# Patient Record
Sex: MALE | Race: WHITE | NOT HISPANIC OR LATINO | Employment: OTHER | URBAN - METROPOLITAN AREA
[De-identification: names, ages, dates, MRNs, and addresses within clinical notes are randomized per-mention and may not be internally consistent; named-entity substitution may affect disease eponyms.]

---

## 2018-03-19 ENCOUNTER — OFFICE VISIT CONVERTED (OUTPATIENT)
Dept: SURGERY | Facility: CLINIC | Age: 63
End: 2018-03-19
Attending: SURGERY

## 2018-04-30 ENCOUNTER — OFFICE VISIT CONVERTED (OUTPATIENT)
Dept: SURGERY | Facility: CLINIC | Age: 63
End: 2018-04-30
Attending: SURGERY

## 2019-01-03 ENCOUNTER — OFFICE VISIT CONVERTED (OUTPATIENT)
Dept: INTERNAL MEDICINE | Facility: CLINIC | Age: 64
End: 2019-01-03
Attending: NURSE PRACTITIONER

## 2019-01-03 ENCOUNTER — CONVERSION ENCOUNTER (OUTPATIENT)
Dept: INTERNAL MEDICINE | Facility: CLINIC | Age: 64
End: 2019-01-03

## 2019-11-08 ENCOUNTER — HOSPITAL ENCOUNTER (OUTPATIENT)
Dept: OTHER | Facility: HOSPITAL | Age: 64
Discharge: HOME | End: 2019-11-08
Attending: NURSE PRACTITIONER

## 2019-11-08 LAB
ALBUMIN SERPL-MCNC: 4.5 G/DL (ref 3.5–5)
ALBUMIN/GLOB SERPL: 1.7 {RATIO} (ref 1.4–2.6)
ALP SERPL-CCNC: 82 U/L (ref 56–155)
ALT SERPL-CCNC: 25 U/L (ref 10–40)
ANION GAP SERPL CALC-SCNC: 22 MMOL/L (ref 8–19)
AST SERPL-CCNC: 18 U/L (ref 15–50)
BASOPHILS # BLD AUTO: 0.04 10*3/UL (ref 0–0.2)
BASOPHILS NFR BLD AUTO: 0.4 % (ref 0–3)
BILIRUB SERPL-MCNC: 0.42 MG/DL (ref 0.2–1.3)
BUN SERPL-MCNC: 19 MG/DL (ref 5–25)
BUN/CREAT SERPL: 21 {RATIO} (ref 6–20)
CALCIUM SERPL-MCNC: 9.8 MG/DL (ref 8.7–10.4)
CHLORIDE SERPL-SCNC: 104 MMOL/L (ref 99–111)
CHOLEST SERPL-MCNC: 200 MG/DL (ref 107–200)
CHOLEST/HDLC SERPL: 4.4 {RATIO} (ref 3–6)
CONV ABS IMM GRAN: 0.03 10*3/UL (ref 0–0.2)
CONV CO2: 20 MMOL/L (ref 22–32)
CONV IMMATURE GRAN: 0.3 % (ref 0–1.8)
CONV TOTAL PROTEIN: 7.1 G/DL (ref 6.3–8.2)
CREAT UR-MCNC: 0.91 MG/DL (ref 0.7–1.2)
DEPRECATED RDW RBC AUTO: 41.8 FL (ref 35.1–43.9)
EOSINOPHIL # BLD AUTO: 0.3 10*3/UL (ref 0–0.7)
EOSINOPHIL # BLD AUTO: 3 % (ref 0–7)
ERYTHROCYTE [DISTWIDTH] IN BLOOD BY AUTOMATED COUNT: 13.2 % (ref 11.6–14.4)
GFR SERPLBLD BASED ON 1.73 SQ M-ARVRAT: >60 ML/MIN/{1.73_M2}
GLOBULIN UR ELPH-MCNC: 2.6 G/DL (ref 2–3.5)
GLUCOSE SERPL-MCNC: 103 MG/DL (ref 70–99)
HCT VFR BLD AUTO: 47.2 % (ref 42–52)
HDLC SERPL-MCNC: 45 MG/DL (ref 40–60)
HGB BLD-MCNC: 15.9 G/DL (ref 14–18)
LDLC SERPL CALC-MCNC: 135 MG/DL (ref 70–100)
LYMPHOCYTES # BLD AUTO: 2.59 10*3/UL (ref 1–5)
LYMPHOCYTES NFR BLD AUTO: 25.7 % (ref 20–45)
MCH RBC QN AUTO: 29.3 PG (ref 27–31)
MCHC RBC AUTO-ENTMCNC: 33.7 G/DL (ref 33–37)
MCV RBC AUTO: 86.9 FL (ref 80–96)
MONOCYTES # BLD AUTO: 0.61 10*3/UL (ref 0.2–1.2)
MONOCYTES NFR BLD AUTO: 6.1 % (ref 3–10)
NEUTROPHILS # BLD AUTO: 6.49 10*3/UL (ref 2–8)
NEUTROPHILS NFR BLD AUTO: 64.5 % (ref 30–85)
NRBC CBCN: 0 % (ref 0–0.7)
OSMOLALITY SERPL CALC.SUM OF ELEC: 297 MOSM/KG (ref 273–304)
PLATELET # BLD AUTO: 235 10*3/UL (ref 130–400)
PMV BLD AUTO: 10.2 FL (ref 9.4–12.4)
POTASSIUM SERPL-SCNC: 4.3 MMOL/L (ref 3.5–5.3)
PSA SERPL-MCNC: 2.71 NG/ML (ref 0–4)
RBC # BLD AUTO: 5.43 10*6/UL (ref 4.7–6.1)
SODIUM SERPL-SCNC: 142 MMOL/L (ref 135–147)
TRIGL SERPL-MCNC: 99 MG/DL (ref 40–150)
VLDLC SERPL-MCNC: 20 MG/DL (ref 5–37)
WBC # BLD AUTO: 10.06 10*3/UL (ref 4.8–10.8)

## 2019-11-14 ENCOUNTER — OFFICE VISIT CONVERTED (OUTPATIENT)
Dept: INTERNAL MEDICINE | Facility: CLINIC | Age: 64
End: 2019-11-14
Attending: NURSE PRACTITIONER

## 2019-11-14 ENCOUNTER — CONVERSION ENCOUNTER (OUTPATIENT)
Dept: INTERNAL MEDICINE | Facility: CLINIC | Age: 64
End: 2019-11-14

## 2020-05-12 ENCOUNTER — TELEMEDICINE CONVERTED (OUTPATIENT)
Dept: INTERNAL MEDICINE | Facility: CLINIC | Age: 65
End: 2020-05-12
Attending: NURSE PRACTITIONER

## 2020-05-15 ENCOUNTER — HOSPITAL ENCOUNTER (OUTPATIENT)
Dept: OTHER | Facility: HOSPITAL | Age: 65
Discharge: HOME | End: 2020-05-15
Attending: NURSE PRACTITIONER

## 2020-05-15 LAB
ALBUMIN SERPL-MCNC: 4.2 G/DL (ref 3.5–5)
ALBUMIN/GLOB SERPL: 1.8 {RATIO} (ref 1.4–2.6)
ALP SERPL-CCNC: 78 U/L (ref 56–155)
ALT SERPL-CCNC: 23 U/L (ref 10–40)
ANION GAP SERPL CALC-SCNC: 18 MMOL/L (ref 8–19)
AST SERPL-CCNC: 18 U/L (ref 15–50)
BASOPHILS # BLD AUTO: 0.04 10*3/UL (ref 0–0.2)
BASOPHILS NFR BLD AUTO: 0.5 % (ref 0–3)
BILIRUB SERPL-MCNC: 0.5 MG/DL (ref 0.2–1.3)
BUN SERPL-MCNC: 19 MG/DL (ref 5–25)
BUN/CREAT SERPL: 20 {RATIO} (ref 6–20)
CALCIUM SERPL-MCNC: 9.2 MG/DL (ref 8.7–10.4)
CHLORIDE SERPL-SCNC: 106 MMOL/L (ref 99–111)
CHOLEST SERPL-MCNC: 179 MG/DL (ref 107–200)
CHOLEST/HDLC SERPL: 4.4 {RATIO} (ref 3–6)
CONV ABS IMM GRAN: 0.03 10*3/UL (ref 0–0.2)
CONV CO2: 22 MMOL/L (ref 22–32)
CONV IMMATURE GRAN: 0.4 % (ref 0–1.8)
CONV TOTAL PROTEIN: 6.6 G/DL (ref 6.3–8.2)
CREAT UR-MCNC: 0.93 MG/DL (ref 0.7–1.2)
DEPRECATED RDW RBC AUTO: 44.2 FL (ref 35.1–43.9)
EOSINOPHIL # BLD AUTO: 0.26 10*3/UL (ref 0–0.7)
EOSINOPHIL # BLD AUTO: 3.4 % (ref 0–7)
ERYTHROCYTE [DISTWIDTH] IN BLOOD BY AUTOMATED COUNT: 13.8 % (ref 11.6–14.4)
GFR SERPLBLD BASED ON 1.73 SQ M-ARVRAT: >60 ML/MIN/{1.73_M2}
GLOBULIN UR ELPH-MCNC: 2.4 G/DL (ref 2–3.5)
GLUCOSE SERPL-MCNC: 96 MG/DL (ref 70–99)
HCT VFR BLD AUTO: 47.3 % (ref 42–52)
HDLC SERPL-MCNC: 41 MG/DL (ref 40–60)
HGB BLD-MCNC: 15.3 G/DL (ref 14–18)
LDLC SERPL CALC-MCNC: 119 MG/DL (ref 70–100)
LYMPHOCYTES # BLD AUTO: 1.75 10*3/UL (ref 1–5)
LYMPHOCYTES NFR BLD AUTO: 23 % (ref 20–45)
MCH RBC QN AUTO: 28.7 PG (ref 27–31)
MCHC RBC AUTO-ENTMCNC: 32.3 G/DL (ref 33–37)
MCV RBC AUTO: 88.7 FL (ref 80–96)
MONOCYTES # BLD AUTO: 0.63 10*3/UL (ref 0.2–1.2)
MONOCYTES NFR BLD AUTO: 8.3 % (ref 3–10)
NEUTROPHILS # BLD AUTO: 4.89 10*3/UL (ref 2–8)
NEUTROPHILS NFR BLD AUTO: 64.4 % (ref 30–85)
NRBC CBCN: 0 % (ref 0–0.7)
OSMOLALITY SERPL CALC.SUM OF ELEC: 296 MOSM/KG (ref 273–304)
PLATELET # BLD AUTO: 208 10*3/UL (ref 130–400)
PMV BLD AUTO: 10.9 FL (ref 9.4–12.4)
POTASSIUM SERPL-SCNC: 4.4 MMOL/L (ref 3.5–5.3)
RBC # BLD AUTO: 5.33 10*6/UL (ref 4.7–6.1)
SODIUM SERPL-SCNC: 142 MMOL/L (ref 135–147)
T4 FREE SERPL-MCNC: 1.1 NG/DL (ref 0.9–1.8)
TRIGL SERPL-MCNC: 94 MG/DL (ref 40–150)
TSH SERPL-ACNC: 1.72 M[IU]/L (ref 0.27–4.2)
VLDLC SERPL-MCNC: 19 MG/DL (ref 5–37)
WBC # BLD AUTO: 7.6 10*3/UL (ref 4.8–10.8)

## 2020-06-03 ENCOUNTER — OFFICE VISIT CONVERTED (OUTPATIENT)
Dept: INTERNAL MEDICINE | Facility: CLINIC | Age: 65
End: 2020-06-03
Attending: NURSE PRACTITIONER

## 2020-06-03 ENCOUNTER — HOSPITAL ENCOUNTER (OUTPATIENT)
Dept: OTHER | Facility: HOSPITAL | Age: 65
Discharge: HOME | End: 2020-06-03
Attending: NURSE PRACTITIONER

## 2020-12-21 ENCOUNTER — TELEMEDICINE CONVERTED (OUTPATIENT)
Dept: INTERNAL MEDICINE | Facility: CLINIC | Age: 65
End: 2020-12-21
Attending: NURSE PRACTITIONER

## 2021-01-21 ENCOUNTER — HOSPITAL ENCOUNTER (OUTPATIENT)
Dept: URGENT CARE | Facility: CLINIC | Age: 66
Discharge: HOME | End: 2021-01-21
Attending: EMERGENCY MEDICINE

## 2021-01-22 LAB — SARS-COV-2 RNA SPEC QL NAA+PROBE: NOT DETECTED

## 2021-05-13 NOTE — PROGRESS NOTES
Progress Note      Patient Name: Durga Alonso   Patient ID: [id number]   Sex: Male   YOB: 1955    Primary Care Provider: Ramandeep KELLEY    Visit Date: May 12, 2020    Provider: WARREN Lema   Location: [facility]   Location Address: [address] [place] [address], [facility]  [place], [place]  [id number]   Location Phone: [phone]          Chief Complaint     f/u-htn    anxiety       History Of Present Illness  Video Conferencing Visit  Durga Alonso is a 65 year old /White male who is presenting for evaluation via video conferencing. Verbal consent obtained before beginning visit.   The following staff were present during this visit: Ramandeep Gray, LIANA; Grzegorz Graham LPN   Durga Alonso is a 65 year old /White male who presents for evaluation and treatment of:      Anxiety-added stress; he has been recently moved to a different office where he is the . 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 boyfriend causing issues. they are supposed to be saving money to find their own place soon  he feels like he is dealing well with stress  sleeping fairly well, sleep hygiene things to improve on  exercising early morning, napping in the afternoon  supervisor at work asked him about self-care/stress relief which made him want to have this visit to discuss  he reports choosing his responses carefully with his wife. he tries to remain positive.  he denies feeling depressed, denies SI/HI    HTN-120/80, HR 60-70s  wt 216, trying to eat healthier    HLD-labs pending, no issues with zetia    via zoom       Past Medical History  Disease  Name Date Onset Notes   Hyperlipidemia --  --    Hypertension --  --          Past Surgical History  Procedure Name Date Notes   *I have had no surgeries --  --          Medication List  Name Date Started Instructions   Glucosamine 500 mg oral tablet  take 1 tablet by oral route daily   lisinopril 20 mg oral tablet 04/14/2020 take 1 tablet (20 mg) by oral route once daily   Zetia 10 mg oral tablet 03/25/2020 take 1 tablet (10 mg) by oral route once daily for 90 days         Allergy List  Allergen Name Date Reaction Notes   NO KNOWN DRUG ALLERGIES --  --  --          Family Medical History  Disease Name Relative/Age Notes   Diabetes, unspecified type Mother/   Mother         Social History  Finding Status Start/Stop Quantity Notes   Alcohol Never --/-- --  drinks no   Caffeine Current every day --/-- --  drinks regularly; coffee; 3-4 times per day   Second hand smoke exposure Never --/-- --  no   Tobacco Former [age] --  former smoker; started smoking at age 12; quit smoking at age 40; smoked 10 cigarette(s) per day         Immunizations  NameDate Admin Mfg Trade Name Lot Number Route Inj VIS Given VIS Publication   Jywshneep31/14/2019 [facility] FLUZONE-HIGH DOSE [id number] [facility] 11/14/2019    Comments: Patient tolerated well left office in stable condition. BERENICE RN   Prevnar 1311/14/2019 [facility] PREVNAR 13 [id number] [facility] 11/14/2019    Comments: Patient tolerated well left office in stable condition. BERENICE RN         Review of Systems  · Constitutional  o Admits  o : weight loss  o Denies  o : fever, fatigue, weight gain  · Cardiovascular  o Denies  o : lower extremity edema, claudication, chest pressure, palpitations  · Respiratory  o Denies  o : shortness of breath, wheezing, frequent cough, hemoptysis, dyspnea on exertion  · Gastrointestinal  o Denies  o : nausea, vomiting, diarrhea, constipation, abdominal pain      Physical Examination     Gen: well-nourished, no acute distress  HENT: atraumatic, normocephalic  Eyes: extraocular  movements intact, no scleral icterus  Lung: breathing comfortably, no cough  Skin: no visible rash, no lesions  Neuro: grossly oriented to person, place, and time. no facial droop   Psych: normal mood and affect             Assessment  · Essential hypertension     401.9/I10  well controlled, he plans to continue monitoring. cont meds at this time  · Hyperlipidemia     272.4/E78.5  cont meds, he will come in this week for labs to be drawn  · Stress     V62.89/F43.9  discussed stress at length. continue to work on sleep hygiene and healthy lifestyle choices-exercise, diet modification, work/life balance. also discussed taking time for himself to relax. he will call for new or worsening concerns      Plan  · Orders  o ACO-39: Current medications updated and reviewed () - - 05/12/2020  · Medications  o Medications have been Reconciled  o Transition of Care or Provider Policy  · Instructions  o Recommended exercise program to assist with cholesterol, weight loss and overall health improvement.  o Advised that cheeses and other sources of dairy fats, animal fats, fast food, and the extras (candy, pastries, pies, doughnuts and cookies) all contain LDL raising nutrients. Advised to increase fruits, vegetables, whole grains, and to monitor portion sizes.   o Patient was educated/instructed on their diagnosis, treatment and medications prior to discharge from the clinic today.  o Call the office with any concerns or questions.  o Discussed Covid-19 precautions including, but not limited to, social distancing, avoid touching your face, and hand washing.   · Disposition  o Call or Return if symptoms worsen or persist.            Electronically Signed by: Ramandeep Gray APRN -Author on May 12, 2020 10:25:30 AM

## 2021-05-13 NOTE — PROGRESS NOTES
Progress Note      Patient Name: Durga Alonso   Patient ID: [id number]   Sex: Male   YOB: 1955    Primary Care Provider: Ramandeep KELLEY    Visit Date: Payal 3, 2020    Provider: WARREN Bobby   Location: [facility]   Location Address: [address] [place] [address], [facility]  [place], [place]  [id number]   Location Phone: [phone]          Chief Complaint  · Left foot big toe stubbed a week ago and now toenail black and loose per patient      History Of Present Illness  Durga Alonso is a 65 year old /White male who presents for evaluation and treatment of:      Patient reports he stubbed his left great toe when walking down stairs striking the end of the toe causing pain, bruising and swelling. This occurred about 1.5 weeks ago and patient continues to have pain, swelling, redness, bruising and noticed that the toe nail is lifting on the left side. Patient denies fever and drainage at this time.       Past Medical History  Disease Name Date Onset Notes   Hyperlipidemia --  --    Hypertension --  --          Past Surgical History  Procedure Name Date Notes   *I have had no surgeries --  --          Medication List  Name Date Started Instructions   lisinopril 20 mg oral tablet 04/14/2020 take 1 tablet (20 mg) by oral route once daily   Zetia 10 mg oral tablet 03/25/2020 take 1 tablet (10 mg) by oral route once daily for 90 days         Allergy List  Allergen Name Date Reaction Notes   NO KNOWN DRUG ALLERGIES --  --  --          Family Medical History  Disease Name Relative/Age Notes   Diabetes, unspecified type Mother/   Mother         Social History  Finding Status Start/Stop Quantity Notes   Alcohol Never --/-- --  drinks no   Caffeine Current every day --/-- --  drinks regularly; coffee; 3-4 times per day   Second hand smoke exposure Never --/-- --  no   Tobacco Former 13/40 --  former smoker; started smoking at age 12; quit smoking at age 40; smoked 10  "cigarette(s) per day         Immunizations  NameDate Admin Mfg Trade Name Lot Number Route Inj VIS Given VIS Publication   Zwcmejuig23/14/2019 [facility] FLUZONE-HIGH DOSE [id number] [facility] 11/14/2019    Comments: Patient tolerated well left office in stable condition. BERENICE RN   Prevnar 1311/14/2019 [facility] PREVNAR 13 [id number] [facility] 11/14/2019    Comments: Patient tolerated well left office in stable condition. BERENICE RN         Review of Systems  · Constitutional  o Denies  o : fever, fatigue, weight loss, weight gain  · Cardiovascular  o Denies  o : lower extremity edema, claudication, chest pressure, palpitations  · Respiratory  o Denies  o : shortness of breath, wheezing, cough, hemoptysis, dyspnea on exertion  · Gastrointestinal  o Denies  o : nausea, vomiting, diarrhea, constipation, abdominal pain  · Musculoskeletal  o Admits  o : joint pain, joint swelling, foot pain      Vitals  Date Time BP Position Site L\R Cuff Size HR RR TEMP (F) WT  HT  BMI kg/m2 BSA m2 O2 Sat        01/03/2019 11:01 /90 Sitting    60 - R 15 97.8 219lbs 8oz 5'  9\" 32.41 2.2 97 %    11/14/2019 02:25 /78 Sitting    56 - R 14 98.6 222lbs 0oz 5'  2\" 40.6 2.1 98 %    06/03/2020 10:37 /84 Sitting    58 - R  97.4 223lbs 16oz 5'  8\" 34.06 2.21 97 %          Physical Examination  · Constitutional  o Appearance  o : no acute distress, well-nourished  · Head and Face  o Head  o :   § Inspection  § : atraumatic, normocephalic  · Eyes  o Eyes  o : extraocular movements intact, no scleral icterus, no conjunctival injection  · Ears, Nose, Mouth and Throat  o Ears  o :   § External Ears  § : normal  o Nose  o :   § Intranasal Exam  § : nares patent  o Oral Cavity  o :   § Oral Mucosa  § : moist mucous membranes  · Respiratory  o Respiratory Effort  o : breathing comfortably, symmetric chest rise  o Auscultation of Lungs  o : clear to asculatation bilaterally, no wheezes, rales, or rhonchii  · Cardiovascular  o Heart  o :   § Auscultation of Heart  § : " regular rate and rhythm, no murmurs, rubs, or gallops  · Neurologic  o Mental Status Examination  o :   § Orientation  § : grossly oriented to person, place and time  o Gait and Station  o :   § Gait Screening  § : normal gait  · Psychiatric  o General  o : normal mood and affect  · Right Ankle/Foot  o Inspection  o : no redness, swelling or atrophy; normal alignment and arch  o Palpation  o : no effusion, crepitus, or clicking  o Neurovascular  o : neurovascularly intact  o Range of Motion  o : normal range of motion  · Left Ankle/Foot  o Inspection  o : erythema present, swelling present, great toe has ecchymosis surrounding the toenail and subungual hematoma present. Left great toe nail is lifting along the medial aspect.   o Palpation  o : tenderness present along the great toe with palpation  o Neurovascular  o : neurovascularly intact , normal sensation  o Range of Motion  o : normal range of motion, with pain in the great toe due to injury.               Assessment  · Foot injury, left, initial encounter     959.7/S99.922A  left great toe pain after injury. Xray performed in office. Left foot x-ray displays a possible nondisplaced fracture line involving the first proximal phalanx. Patient was educated on pain relief with Tylenol and ibuprofen(being cautious with ibuprofen due to cardiovascular risk). Ice and elevation. Patient educated on how to splint the great toe with a popsicle stick and deborah taping system. Patient will follow-up with podiatry regarding toenail and fracture.  · Swelling     782.3/R60.9  · Toenail bruise, left, initial encounter     924.3/S90.222A  Due to redness and inflammation patient was started on Keflex at this time for possible infection. Toenail appears to be lifting on the left side, will get patient in with podiatry promptly to remove toenail. Educated patient on worsening s/s of infection to include redness, fever, drainage, pain. Educated to call the office with any new or  worsening symptoms.     Problems Reconciled  Plan  · Orders  o ACO-39: Current medications updated and reviewed () - - 06/03/2020  o Foot (Left) 3 or more views X-Ray [facility] Preferred View ([id number]) - 959.7/S99.922A, 782.3/R60.9, 924.3/S90.222A - 06/03/2020  o PODIATRY CONSULTATION (PODIA) - 959.7/S99.922A, 782.3/R60.9, 924.3/S90.222A - 06/03/2020  · Medications  o cephalexin 500 mg oral capsule   SIG: take 1 capsule (500 mg) by oral route every 12 hours for 10 days   DISP: (20) capsule with 0 refills  Prescribed on 06/03/2020     o Medications have been Reconciled  o Transition of Care or Provider Policy  · Instructions  o Take all medications as prescribed/directed.  o Patient was educated/instructed on their diagnosis, treatment and medications prior to discharge from the clinic today.  o Patient instructed to seek medical attention urgently for new or worsening symptoms.  o Call the office with any concerns or questions.  · Disposition  o Call or Return if symptoms worsen or persist.  o Meds sent to pharmacy  o As Needed for Follow Up  o Care Transition  o LURDES Sent            Electronically Signed by: WARREN Bobby -Author on Payal 3, 2020 01:01:22 PM

## 2021-05-14 NOTE — PROGRESS NOTES
"   Progress Note      Patient Name: Durga Alonso   Patient ID: [id number]   Sex: Male   YOB: 1955    Primary Care Provider: Ramandeep KELLEY    Visit Date: December 21, 2020    Provider: WARREN Lema   Location: [facility]   Location Address: [address] [place] [address], [address]  [place], [place]  [id number]   Location Phone: [phone]          Chief Complaint  · Telehealth/Zoom  · Follow up  · \"I need to talk about my spouse and her situation\"  · \"I recently went to the ER\"      History Of Present Illness  Video Conferencing Visit  Durga Alonso is a 65 year old /White male who is presenting for evaluation via video conferencing via Zoom. Verbal consent obtained before beginning visit.   The following staff were present during this visit: Ramandeep Gray,LIANA; Carly PHELPS MA      He did have some chest pain last week. was seen in the ED  right sided chest pain, stabbing near shoulder, radiated down right arm x1  denies SOA and sweating with CP episode  only occurred once after waking from sleep while lying in bed  he has been going up and down stairs several times per day. he denies having cp, fatigue, soa with activity or recurrence of CP episodes  no family hx of cardiac events    toe injury last june. new nail growing underneath. old nail damaged and slowly coming loose, not bothersome    concerned about his wife's health. earlier in the year she decided to stop all routine medications. She has seen dietician. stopped several supplements after this visit. she has restarted many of her routine meds at this time.  he reports that she is trying to eat healthier which is good. he feels like stress r/t her health may have contributed to his chest pain. he also reports job stress. he reports taking a step back from work and trying to change the way he views his job is helping some as well   Durga Alonso is a 65 year old /White male who presents for " evaluation and treatment of:       Past Medical History  Disease Name Date Onset Notes   Hyperlipidemia --  --    Hypertension --  --          Past Surgical History  Procedure Name Date Notes   *I have had no surgeries --  --          Medication List  Name Date Started Instructions   lisinopril 20 mg oral tablet 12/03/2020 take 1 tablet (20 mg) by oral route once daily   Zetia 10 mg oral tablet 09/01/2020 take 1 tablet (10 mg) by oral route once daily for 90 days         Allergy List  Allergen Name Date Reaction Notes   NO KNOWN DRUG ALLERGIES --  --  --        Allergies Reconciled  Family Medical History  Disease Name Relative/Age Notes   Diabetes, unspecified type Mother/   Mother         Social History  Finding Status Start/Stop Quantity Notes   Alcohol Never --/-- --  drinks no   Caffeine Current every day --/-- --  drinks regularly; coffee; 3-4 times per day   Second hand smoke exposure Never --/-- --  no   Tobacco Former [age] --  former smoker; started smoking at age 12; quit smoking at age 40; smoked 10 cigarette(s) per day         Immunizations  NameDate Admin Mfg Trade Name Lot Number Route Inj VIS Given VIS Publication   Ugbngnirk72/14/2019 [facility] FLUZONE-HIGH DOSE [id number] [facility] 11/14/2019    Comments: Patient tolerated well left office in stable condition. BERENICE RN   Prevnar 1311/14/2019 [facility] PREVNAR 13 [id number] [facility] 11/14/2019    Comments: Patient tolerated well left office in stable condition. BERENICE RN         Review of Systems  · Constitutional  o Denies  o : fever, fatigue, weight loss, weight gain  · Cardiovascular  o Denies  o : lower extremity edema, claudication, chest pressure, palpitations  · Respiratory  o Denies  o : shortness of breath, wheezing, cough, hemoptysis, dyspnea on exertion  · Gastrointestinal  o Denies  o : nausea, vomiting, diarrhea, constipation, abdominal pain      Physical Examination     Gen: well-nourished, no acute distress  HENT: atraumatic, normocephalic  Eyes: extraocular  movements intact, no scleral icterus  Lung: breathing comfortably, no cough  Skin: no visible rash, no lesions  Neuro: grossly oriented to person, place, and time. no facial droop   Psych: normal mood and affect             Assessment  · Hyperlipidemia     272.4/E78.5  · Chest pain     786.50/R07.9  Discussed multiple potential causes for chest pain including cardiac etiology. He will continue to monitor symptoms for recurrence. Verbalized that we could potentially do further work-up such as a stress test at this time. He has low suspicion that this is cardiac related. Discussed need for further work-up if he has recurrence of symptoms or if he develops symptoms such as shortness of breath, dizziness, chest pain with activities such as walking up the stairs. also discussed importance of stress management  · Essential hypertension     401.9/I10  he will come in for labs tomorrow. continue current medications    Problems Reconciled  Plan  · Orders  o Physical, Primary Care Panel (CBC, CMP, Lipid, TSH) [facility] ([id number], 35349, 80154, [address]10) - 401.9/I10, 272.4/E78.5 - 12/21/2020  o ACO-39: Current medications updated and reviewed (, 1159F) - - 12/21/2020  · Medications  o Medications have been Reconciled  o Transition of Care or Provider Policy  · Instructions  o Patient was educated/instructed on their diagnosis, treatment and medications prior to discharge from the clinic today.  o Call the office with any concerns or questions.  · Disposition  o Call or Return if symptoms worsen or persist.  o Follow up in 6 months  o Prescriptions sent electronically            Electronically Signed by: WARREN Lema -Author on December 21, 2020 12:42:04 PM

## 2021-05-15 VITALS
OXYGEN SATURATION: 98 % | HEIGHT: 62 IN | WEIGHT: 222 LBS | HEART RATE: 56 BPM | TEMPERATURE: 98.6 F | BODY MASS INDEX: 40.85 KG/M2 | RESPIRATION RATE: 14 BRPM | DIASTOLIC BLOOD PRESSURE: 78 MMHG | SYSTOLIC BLOOD PRESSURE: 118 MMHG

## 2021-05-15 VITALS
HEART RATE: 58 BPM | DIASTOLIC BLOOD PRESSURE: 84 MMHG | OXYGEN SATURATION: 97 % | WEIGHT: 224 LBS | TEMPERATURE: 97.4 F | HEIGHT: 68 IN | SYSTOLIC BLOOD PRESSURE: 132 MMHG | BODY MASS INDEX: 33.95 KG/M2

## 2021-05-16 VITALS
HEART RATE: 60 BPM | WEIGHT: 219.5 LBS | OXYGEN SATURATION: 97 % | RESPIRATION RATE: 15 BRPM | DIASTOLIC BLOOD PRESSURE: 90 MMHG | SYSTOLIC BLOOD PRESSURE: 138 MMHG | HEIGHT: 69 IN | BODY MASS INDEX: 32.51 KG/M2 | TEMPERATURE: 97.8 F

## 2021-05-16 VITALS — BODY MASS INDEX: 33.03 KG/M2 | RESPIRATION RATE: 14 BRPM | WEIGHT: 223 LBS | HEIGHT: 69 IN

## 2021-05-21 ENCOUNTER — HOSPITAL ENCOUNTER (OUTPATIENT)
Dept: OTHER | Facility: HOSPITAL | Age: 66
Discharge: HOME | End: 2021-05-21
Attending: NURSE PRACTITIONER

## 2021-05-21 LAB
ALBUMIN SERPL-MCNC: 4.3 G/DL (ref 3.5–5)
ALBUMIN/GLOB SERPL: 1.6 {RATIO} (ref 1.4–2.6)
ALP SERPL-CCNC: 87 U/L (ref 56–155)
ALT SERPL-CCNC: 27 U/L (ref 10–40)
ANION GAP SERPL CALC-SCNC: 14 MMOL/L (ref 8–19)
AST SERPL-CCNC: 20 U/L (ref 15–50)
BASOPHILS # BLD AUTO: 0.04 10*3/UL (ref 0–0.2)
BASOPHILS NFR BLD AUTO: 0.5 % (ref 0–3)
BILIRUB SERPL-MCNC: 0.3 MG/DL (ref 0.2–1.3)
BUN SERPL-MCNC: 23 MG/DL (ref 5–25)
BUN/CREAT SERPL: 29 {RATIO} (ref 6–20)
CALCIUM SERPL-MCNC: 9.2 MG/DL (ref 8.7–10.4)
CHLORIDE SERPL-SCNC: 108 MMOL/L (ref 99–111)
CHOLEST SERPL-MCNC: 197 MG/DL (ref 107–200)
CHOLEST/HDLC SERPL: 4.4 {RATIO} (ref 3–6)
CONV ABS IMM GRAN: 0.03 10*3/UL (ref 0–0.2)
CONV CO2: 24 MMOL/L (ref 22–32)
CONV IMMATURE GRAN: 0.4 % (ref 0–1.8)
CONV TOTAL PROTEIN: 7 G/DL (ref 6.3–8.2)
CREAT UR-MCNC: 0.8 MG/DL (ref 0.7–1.2)
DEPRECATED RDW RBC AUTO: 43.1 FL (ref 35.1–43.9)
EOSINOPHIL # BLD AUTO: 0.25 10*3/UL (ref 0–0.7)
EOSINOPHIL # BLD AUTO: 3.4 % (ref 0–7)
ERYTHROCYTE [DISTWIDTH] IN BLOOD BY AUTOMATED COUNT: 13.5 % (ref 11.6–14.4)
GFR SERPLBLD BASED ON 1.73 SQ M-ARVRAT: >60 ML/MIN/{1.73_M2}
GLOBULIN UR ELPH-MCNC: 2.7 G/DL (ref 2–3.5)
GLUCOSE SERPL-MCNC: 100 MG/DL (ref 70–99)
HCT VFR BLD AUTO: 49.3 % (ref 42–52)
HDLC SERPL-MCNC: 45 MG/DL (ref 40–60)
HGB BLD-MCNC: 16.2 G/DL (ref 14–18)
LDLC SERPL CALC-MCNC: 128 MG/DL (ref 70–100)
LYMPHOCYTES # BLD AUTO: 1.57 10*3/UL (ref 1–5)
LYMPHOCYTES NFR BLD AUTO: 21.1 % (ref 20–45)
MCH RBC QN AUTO: 28.6 PG (ref 27–31)
MCHC RBC AUTO-ENTMCNC: 32.9 G/DL (ref 33–37)
MCV RBC AUTO: 87.1 FL (ref 80–96)
MONOCYTES # BLD AUTO: 0.53 10*3/UL (ref 0.2–1.2)
MONOCYTES NFR BLD AUTO: 7.1 % (ref 3–10)
NEUTROPHILS # BLD AUTO: 5.03 10*3/UL (ref 2–8)
NEUTROPHILS NFR BLD AUTO: 67.5 % (ref 30–85)
NRBC CBCN: 0 % (ref 0–0.7)
OSMOLALITY SERPL CALC.SUM OF ELEC: 298 MOSM/KG (ref 273–304)
PLATELET # BLD AUTO: 210 10*3/UL (ref 130–400)
PMV BLD AUTO: 11 FL (ref 9.4–12.4)
POTASSIUM SERPL-SCNC: 4.3 MMOL/L (ref 3.5–5.3)
RBC # BLD AUTO: 5.66 10*6/UL (ref 4.7–6.1)
SODIUM SERPL-SCNC: 142 MMOL/L (ref 135–147)
TRIGL SERPL-MCNC: 119 MG/DL (ref 40–150)
TSH SERPL-ACNC: 2.35 M[IU]/L (ref 0.27–4.2)
VLDLC SERPL-MCNC: 24 MG/DL (ref 5–37)
WBC # BLD AUTO: 7.45 10*3/UL (ref 4.8–10.8)

## 2021-06-04 ENCOUNTER — OFFICE VISIT CONVERTED (OUTPATIENT)
Dept: INTERNAL MEDICINE | Facility: CLINIC | Age: 66
End: 2021-06-04
Attending: NURSE PRACTITIONER

## 2021-06-05 NOTE — PROGRESS NOTES
"   Progress Note      Patient Name: Durga Alonso   Patient ID: [id number]   Sex: Male   YOB: 1955    Primary Care Provider: Ramandeep KELLEY    Visit Date: June 4, 2021    Provider: WARREN LOU   Location: [facility]   Location Address: [address] [place] [address], [address]  [place], [place]  [id number]   Location Phone: [phone]          Chief Complaint  · Follow Up on blood work  · \" I think I picked up poison ivy \"      History Of Present Illness  Durag Alonso is a 66 year old /White male who presents for evaluation and treatment of:      Patient reports he is presenting for annual follow up appointment related to chronic management of hypertension and hyperlipidemia.     Hypertension- 120/80 at home on average per the patient. Denies cp, soa, edema, headaches. Taking all medications as prescribed.     Zetia- doing well with this medication, denies myalgia.     Diet- well balance.    Fishing as a hobby, new boat.     Insomnia- unisom which is helping a lot.     Poison ivy- healing well. Present for 1 week.     Labs: up to date. Reviewed in office with patient.   Dental exam- periodontal disease, . Follow up August.   Eye exam: January, cataract surgery. Follow up this month.  Colonoscopy- up to date until 2028, no family history.       Past Medical History  Disease Name Date Onset Notes   Hyperlipidemia --  --    Hypertension --  --          Past Surgical History  Procedure Name Date Notes   *I have had no surgeries --  --          Medication List  Name Date Started Instructions   lisinopril 20 mg oral tablet 05/24/2021 take 1 tablet (20 mg) by oral route once daily   Zetia 10 mg oral tablet 05/24/2021 take 1 tablet (10 mg) by oral route once daily for 90 days         Allergy List  Allergen Name Date Reaction Notes   NO KNOWN DRUG ALLERGIES --  --  --        Allergies Reconciled  Family Medical History  Disease Name Relative/Age Notes " "  Diabetes, unspecified type Mother/   Mother         Social History  Finding Status Start/Stop Quantity Notes   Alcohol Never --/-- --  drinks no   Caffeine Current every day --/-- --  drinks regularly; coffee; 3-4 times per day   Second hand smoke exposure Never --/-- --  no   Tobacco Former [age] --  former smoker; started smoking at age 12; quit smoking at age 40; smoked 10 cigarette(s) per day         Immunizations  NameDate Admin Mfg Trade Name Lot Number Route Inj VIS Given VIS Publication   Pigkdpind54/14/2019 [facility] FLUZONE-HIGH DOSE [id number] [facility] 11/14/2019    Comments: Patient tolerated well left office in stable condition. CH RN   Prevnar 1311/14/2019 [facility] PREVNAR 13 [id number] [facility] 11/14/2019    Comments: Patient tolerated well left office in stable condition. BERENICE RN         Review of Systems  · Constitutional  o Denies  o : fever, fatigue, weight loss, weight gain  · Cardiovascular  o Denies  o : lower extremity edema, claudication, chest pressure, palpitations  · Respiratory  o Denies  o : shortness of breath, wheezing, frequent cough, hemoptysis, dyspnea on exertion  · Gastrointestinal  o Denies  o : nausea, vomiting, diarrhea, constipation, abdominal pain      Vitals  Date Time BP Position Site L\R Cuff Size HR RR TEMP (F) WT  HT  BMI kg/m2 BSA m2 O2 Sat FR L/min FiO2        11/14/2019 02:25 /78 Sitting    56 - R 14 98.6 222lbs 0oz 5'  2\" 40.6 2.1 98 %  21%    06/03/2020 10:37 /84 Sitting    58 - R  97.4 223lbs 16oz 5'  8\" 34.06 2.21 97 %      06/04/2021 10:43 /76 Sitting    78 - R  97.4 216lbs 5oz 5'  8\" 32.89 2.17 97 %            Physical Examination  · Constitutional  o Appearance  o : no acute distress, well-nourished  · Head and Face  o Head  o :   § Inspection  § : atraumatic, normocephalic  · Eyes  o Eyes  o : extraocular movements intact, no scleral icterus, no conjunctival injection  · Ears, Nose, Mouth and Throat  o Ears  o :   § External Ears  § : normal  § Otoscopic " Examination  § : tympanic membrane appearance within normal limits bilaterally  o Nose  o :   § Intranasal Exam  § : nares patent  o Oral Cavity  o :   § Oral Mucosa  § : moist mucous membranes  o Throat  o :   § Oropharynx  § : no inflammation or lesions present, tonsils within normal limits  · Respiratory  o Respiratory Effort  o : breathing comfortably, symmetric chest rise  o Auscultation of Lungs  o : clear to asculatation bilaterally, no wheezes, rales, or rhonchii  · Cardiovascular  o Heart  o :   § Auscultation of Heart  § : regular rate and rhythm, no murmurs, rubs, or gallops  o Peripheral Vascular System  o :   § Extremities  § : no edema  · Gastrointestinal  o Abdominal Examination  o :   § Abdomen  § : bowel sounds present, non-distended, non-tender  · Lymphatic  o Neck  o : no lymphadenopathy present  o Supraclavicular Nodes  o : no supraclavicular nodes  · Skin and Subcutaneous Tissue  o General Inspection  o : no lesions present, no areas of discoloration, skin turgor normal  · Neurologic  o Mental Status Examination  o :   § Orientation  § : grossly oriented to person, place and time  o Gait and Station  o :   § Gait Screening  § : normal gait  · Psychiatric  o General  o : normal mood and affect          Assessment  · Hypertension     401.9/I10  well controlled, continue regimen. 1 year follow up   · Hyperlipidemia     272.4/E78.5  well controlled. labs reviewed. 1 year follow up   · Screening for depression     V79.0/Z13.89  PHQ 9: 2  · Annual physical exam     V70.0/Z00.00  no concerns.     Problems Reconciled  Plan  · Orders  o ACO-18: Negative screen for clinical depression using a standardized tool () - - 06/04/2021  o ACO-19: Colorectal cancer screening results documented and reviewed (3017F) - - 06/04/2021  o ACO-13: Fall Risk Screening with no falls in past year or only one fall without injury in the past year (1101F) - - 06/04/2021  o ACO-39: Current medications updated and reviewed  (1159F, ) - - 06/04/2021  · Medications  o Medications have been Reconciled  o Transition of Care or Provider Policy  · Instructions  o Advised that cheeses and other sources of dairy fats, animal fats, fast food, and the extras (candy, pastries, pies, doughnuts and cookies) all contain LDL raising nutrients. Advised to increase fruits, vegetables, whole grains, and to monitor portion sizes.   o Depression Screen completed and scanned into the EMR under the designated folder within the patient's documents.  o Today's PHQ-9 result is 2  o Reviewed health maintenance flowsheet and updated information. Orders were placed and/or patient's response was documented.  o Take all medications as prescribed/directed.  o Patient was educated/instructed on their diagnosis, treatment and medications prior to discharge from the clinic today.  o Call the office with any concerns or questions.  · Disposition  o Call or Return if symptoms worsen or persist.  o 1 year follow up            Electronically Signed by: WARREN LOU -Author on June 4, 2021 01:00:05 PM

## 2021-07-15 VITALS
SYSTOLIC BLOOD PRESSURE: 130 MMHG | DIASTOLIC BLOOD PRESSURE: 76 MMHG | OXYGEN SATURATION: 97 % | BODY MASS INDEX: 32.78 KG/M2 | HEIGHT: 68 IN | TEMPERATURE: 97.4 F | HEART RATE: 78 BPM | WEIGHT: 216.31 LBS

## 2021-09-28 PROBLEM — I10 HYPERTENSION: Status: ACTIVE | Noted: 2021-09-28

## 2021-09-28 PROBLEM — E78.5 HYPERLIPIDEMIA: Status: ACTIVE | Noted: 2021-09-28

## 2021-09-29 ENCOUNTER — OFFICE VISIT (OUTPATIENT)
Dept: INTERNAL MEDICINE | Facility: CLINIC | Age: 66
End: 2021-09-29

## 2021-09-29 VITALS
HEIGHT: 68 IN | BODY MASS INDEX: 32.46 KG/M2 | TEMPERATURE: 97.9 F | DIASTOLIC BLOOD PRESSURE: 62 MMHG | SYSTOLIC BLOOD PRESSURE: 130 MMHG | OXYGEN SATURATION: 97 % | HEART RATE: 65 BPM | WEIGHT: 214.2 LBS

## 2021-09-29 DIAGNOSIS — E78.5 HYPERLIPIDEMIA, UNSPECIFIED HYPERLIPIDEMIA TYPE: Primary | ICD-10-CM

## 2021-09-29 DIAGNOSIS — R53.83 OTHER FATIGUE: ICD-10-CM

## 2021-09-29 DIAGNOSIS — R41.3 MEMORY LOSS: ICD-10-CM

## 2021-09-29 DIAGNOSIS — I10 ESSENTIAL HYPERTENSION: ICD-10-CM

## 2021-09-29 LAB
ALBUMIN SERPL-MCNC: 4.4 G/DL (ref 3.5–5.2)
ALBUMIN/GLOB SERPL: 1.8 G/DL
ALP SERPL-CCNC: 91 U/L (ref 39–117)
ALT SERPL W P-5'-P-CCNC: 26 U/L (ref 1–41)
ANION GAP SERPL CALCULATED.3IONS-SCNC: 9.9 MMOL/L (ref 5–15)
AST SERPL-CCNC: 18 U/L (ref 1–40)
BASOPHILS # BLD AUTO: 0.05 10*3/MM3 (ref 0–0.2)
BASOPHILS NFR BLD AUTO: 0.7 % (ref 0–1.5)
BILIRUB SERPL-MCNC: 0.5 MG/DL (ref 0–1.2)
BUN SERPL-MCNC: 16 MG/DL (ref 8–23)
BUN/CREAT SERPL: 15.4 (ref 7–25)
CALCIUM SPEC-SCNC: 9.3 MG/DL (ref 8.6–10.5)
CHLORIDE SERPL-SCNC: 106 MMOL/L (ref 98–107)
CO2 SERPL-SCNC: 24.1 MMOL/L (ref 22–29)
CREAT SERPL-MCNC: 1.04 MG/DL (ref 0.76–1.27)
DEPRECATED RDW RBC AUTO: 41.9 FL (ref 37–54)
EOSINOPHIL # BLD AUTO: 0.15 10*3/MM3 (ref 0–0.4)
EOSINOPHIL NFR BLD AUTO: 2 % (ref 0.3–6.2)
ERYTHROCYTE [DISTWIDTH] IN BLOOD BY AUTOMATED COUNT: 13.3 % (ref 12.3–15.4)
FOLATE SERPL-MCNC: 5.55 NG/ML (ref 4.78–24.2)
GFR SERPL CREATININE-BSD FRML MDRD: 71 ML/MIN/1.73
GLOBULIN UR ELPH-MCNC: 2.5 GM/DL
GLUCOSE SERPL-MCNC: 99 MG/DL (ref 65–99)
HCT VFR BLD AUTO: 47.7 % (ref 37.5–51)
HGB BLD-MCNC: 16 G/DL (ref 13–17.7)
IMM GRANULOCYTES # BLD AUTO: 0.02 10*3/MM3 (ref 0–0.05)
IMM GRANULOCYTES NFR BLD AUTO: 0.3 % (ref 0–0.5)
LYMPHOCYTES # BLD AUTO: 1.86 10*3/MM3 (ref 0.7–3.1)
LYMPHOCYTES NFR BLD AUTO: 24.3 % (ref 19.6–45.3)
MCH RBC QN AUTO: 28.8 PG (ref 26.6–33)
MCHC RBC AUTO-ENTMCNC: 33.5 G/DL (ref 31.5–35.7)
MCV RBC AUTO: 85.9 FL (ref 79–97)
MONOCYTES # BLD AUTO: 0.55 10*3/MM3 (ref 0.1–0.9)
MONOCYTES NFR BLD AUTO: 7.2 % (ref 5–12)
NEUTROPHILS NFR BLD AUTO: 5.02 10*3/MM3 (ref 1.7–7)
NEUTROPHILS NFR BLD AUTO: 65.5 % (ref 42.7–76)
NRBC BLD AUTO-RTO: 0 /100 WBC (ref 0–0.2)
PLATELET # BLD AUTO: 197 10*3/MM3 (ref 140–450)
PMV BLD AUTO: 10.6 FL (ref 6–12)
POTASSIUM SERPL-SCNC: 4 MMOL/L (ref 3.5–5.2)
PROT SERPL-MCNC: 6.9 G/DL (ref 6–8.5)
RBC # BLD AUTO: 5.55 10*6/MM3 (ref 4.14–5.8)
SODIUM SERPL-SCNC: 140 MMOL/L (ref 136–145)
T PALLIDUM IGG SER QL: NORMAL
T4 FREE SERPL-MCNC: 1.09 NG/DL (ref 0.93–1.7)
TESTOST SERPL-MCNC: 174 NG/DL (ref 193–740)
TSH SERPL DL<=0.05 MIU/L-ACNC: 2.64 UIU/ML (ref 0.27–4.2)
VIT B12 BLD-MCNC: 526 PG/ML (ref 211–946)
WBC # BLD AUTO: 7.65 10*3/MM3 (ref 3.4–10.8)

## 2021-09-29 PROCEDURE — 86665 EPSTEIN-BARR CAPSID VCA: CPT | Performed by: NURSE PRACTITIONER

## 2021-09-29 PROCEDURE — 82746 ASSAY OF FOLIC ACID SERUM: CPT | Performed by: NURSE PRACTITIONER

## 2021-09-29 PROCEDURE — 84403 ASSAY OF TOTAL TESTOSTERONE: CPT | Performed by: NURSE PRACTITIONER

## 2021-09-29 PROCEDURE — 36415 COLL VENOUS BLD VENIPUNCTURE: CPT | Performed by: NURSE PRACTITIONER

## 2021-09-29 PROCEDURE — 80053 COMPREHEN METABOLIC PANEL: CPT | Performed by: NURSE PRACTITIONER

## 2021-09-29 PROCEDURE — 86645 CMV ANTIBODY IGM: CPT | Performed by: NURSE PRACTITIONER

## 2021-09-29 PROCEDURE — 85025 COMPLETE CBC W/AUTO DIFF WBC: CPT | Performed by: NURSE PRACTITIONER

## 2021-09-29 PROCEDURE — 84443 ASSAY THYROID STIM HORMONE: CPT | Performed by: NURSE PRACTITIONER

## 2021-09-29 PROCEDURE — 86664 EPSTEIN-BARR NUCLEAR ANTIGEN: CPT | Performed by: NURSE PRACTITIONER

## 2021-09-29 PROCEDURE — 99214 OFFICE O/P EST MOD 30 MIN: CPT | Performed by: NURSE PRACTITIONER

## 2021-09-29 PROCEDURE — 82607 VITAMIN B-12: CPT | Performed by: NURSE PRACTITIONER

## 2021-09-29 PROCEDURE — 86644 CMV ANTIBODY: CPT | Performed by: NURSE PRACTITIONER

## 2021-09-29 PROCEDURE — 84439 ASSAY OF FREE THYROXINE: CPT | Performed by: NURSE PRACTITIONER

## 2021-09-29 PROCEDURE — 86780 TREPONEMA PALLIDUM: CPT | Performed by: NURSE PRACTITIONER

## 2021-09-29 RX ORDER — LISINOPRIL 20 MG/1
TABLET ORAL
COMMUNITY
Start: 2021-05-24 | End: 2021-11-10

## 2021-09-29 RX ORDER — EZETIMIBE 10 MG/1
TABLET ORAL
COMMUNITY
Start: 2021-05-24 | End: 2021-11-10

## 2021-09-29 NOTE — ASSESSMENT & PLAN NOTE
We will check labs.  Discussed healthy lifestyle habits including sleep, diet, exercise, and caffeine use.

## 2021-09-29 NOTE — PROGRESS NOTES
"Chief Complaint  Memory Loss (Present for a couple of months ) and Fatigue (Lack of energy )    Subjective          Durga Butts presents to [facility]  He reports issues with memory in the last few months. He reports that he often forgets things when trying to leave the house. He reports feeling  Feeling fatigued for several months. He typically gets       Objective   Vital Signs:   /62   Pulse 65   Temp 97.9 °F (36.6 °C) (Temporal)   Ht 172.7 cm (68\")   Wt 97.2 kg (214 lb 3.2 oz)   SpO2 97%   BMI 32.57 kg/m²     Physical Exam   Result Review :          Procedures      Assessment and Plan    Diagnoses and all orders for this visit:    1. Hyperlipidemia, unspecified hyperlipidemia type (Primary)    2. Essential hypertension    3. Memory loss              Follow Up   No follow-ups on file.  Patient was given instructions and counseling regarding his condition or for health maintenance advice. Please see specific information pulled into the AVS if appropriate.       "

## 2021-09-29 NOTE — ASSESSMENT & PLAN NOTE
Normal neuro exam in the office.  Will check labs.  Discussed multiple causes with low suspicion for dementia type process.  Patient denies feeling down or depressed.  Counseled on managing stress. MOCA in the office today, reviewed

## 2021-09-29 NOTE — PROGRESS NOTES
"Chief Complaint  Memory Loss (Present for a couple of months ) and Fatigue (Lack of energy )    Subjective          Durga Butts presents to [facility]  History of Present Illness  Patient reports having issues with memory over the last 3 to 4 months.  He states that he often cannot make it out of the house without forgetting his keys and has to make 2 or 3 trips back in order to get everything he needs.  He denies having other people noticed that he is having issues with memory.  He denies other cognitive deficits.  Denies blurred vision, headache, weakness, or trouble finding words.    Patient also reports having fatigue over the last few months.  He reports getting 7 to 8 hours of sleep at night.  He reports feeling rested when he wakes but then is tired in the early afternoon.  He reports that his job is demanding and often stressful.  He reports using caffeine first thing in the morning-3 cups of coffee.  He does not report using caffeine later in the day.  He has been watching his diet and trying to eat healthy.  He has lost about 10 pounds in the last year.  Objective   Vital Signs:   /62   Pulse 65   Temp 97.9 °F (36.6 °C) (Temporal)   Ht 172.7 cm (68\")   Wt 97.2 kg (214 lb 3.2 oz)   SpO2 97%   BMI 32.57 kg/m²     Physical Exam  Vitals and nursing note reviewed.   Constitutional:       General: He is not in acute distress.     Appearance: Normal appearance.   HENT:      Head: Normocephalic and atraumatic.      Right Ear: External ear normal.      Left Ear: External ear normal.      Nose: Nose normal.      Mouth/Throat:      Mouth: Mucous membranes are moist.   Eyes:      Conjunctiva/sclera: Conjunctivae normal.   Cardiovascular:      Rate and Rhythm: Normal rate and regular rhythm.      Pulses: Normal pulses.      Heart sounds: Normal heart sounds. No murmur heard.   No friction rub. No gallop.    Pulmonary:      Effort: Pulmonary effort is " normal. No respiratory distress.      Breath sounds: No wheezing, rhonchi or rales.   Musculoskeletal:      Cervical back: Neck supple.   Skin:     General: Skin is warm and dry.   Neurological:      General: No focal deficit present.      Mental Status: He is alert and oriented to person, place, and time.      Cranial Nerves: Cranial nerves are intact. No dysarthria or facial asymmetry.      Sensory: Sensation is intact.      Motor: Motor function is intact.      Coordination: Coordination is intact. Finger-Nose-Finger Test and Heel to Shin Test normal.      Gait: Gait is intact.   Psychiatric:         Mood and Affect: Mood normal.         Behavior: Behavior normal.        Result Review :          Procedures      Assessment and Plan    Diagnoses and all orders for this visit:    1. Hyperlipidemia, unspecified hyperlipidemia type (Primary)  Assessment & Plan:  We will check labs in the office today.  Will review and adjust medications as needed    Orders:  -     CBC & Differential  -     Comprehensive Metabolic Panel  -     TSH  -     T4, Free  -     EBV Antibody Profile  -     CMV IgG IgM  -     Vitamin B12  -     Folate  -     Testosterone    2. Essential hypertension  Assessment & Plan:  Well-controlled.  Continue current meds    Orders:  -     CBC & Differential  -     Comprehensive Metabolic Panel  -     TSH  -     T4, Free  -     EBV Antibody Profile  -     CMV IgG IgM  -     Vitamin B12  -     Folate  -     Testosterone    3. Memory loss  Assessment & Plan:  Normal neuro exam in the office.  Will check labs.  Discussed multiple causes with low suspicion for dementia type process.  Patient denies feeling down or depressed.  Counseled on managing stress. MOCA in the office today, reviewed    Orders:  -     CBC & Differential  -     Comprehensive Metabolic Panel  -     TSH  -     T4, Free  -     EBV Antibody Profile  -     CMV IgG IgM  -     Vitamin B12  -     Folate  -     Testosterone  -     T PALLIDUM AB  (FTA-AB)    4. Other fatigue  Assessment & Plan:  We will check labs.  Discussed healthy lifestyle habits including sleep, diet, exercise, and caffeine use.    Orders:  -     CBC & Differential  -     Comprehensive Metabolic Panel  -     TSH  -     T4, Free  -     EBV Antibody Profile  -     CMV IgG IgM  -     Vitamin B12  -     Folate  -     Testosterone            Follow Up   Return in about 3 months (around 12/29/2021).  Patient was given instructions and counseling regarding his condition or for health maintenance advice. Please see specific information pulled into the AVS if appropriate.

## 2021-09-30 LAB
CMV IGG SERPL IA-ACNC: <0.6 U/ML (ref 0–0.59)
CMV IGM SERPL IA-ACNC: <30 AU/ML (ref 0–29.9)
EBV NA IGG SER IA-ACNC: 145 U/ML (ref 0–17.9)
EBV VCA IGG SER IA-ACNC: 168 U/ML (ref 0–17.9)
EBV VCA IGM SER IA-ACNC: <36 U/ML (ref 0–35.9)
SERVICE CMNT-IMP: ABNORMAL

## 2021-11-10 RX ORDER — EZETIMIBE 10 MG/1
TABLET ORAL
Qty: 90 TABLET | Refills: 3 | Status: SHIPPED | OUTPATIENT
Start: 2021-11-10 | End: 2022-11-30 | Stop reason: SDUPTHER

## 2021-11-10 RX ORDER — LISINOPRIL 20 MG/1
TABLET ORAL
Qty: 90 TABLET | Refills: 3 | Status: SHIPPED | OUTPATIENT
Start: 2021-11-10 | End: 2022-11-30 | Stop reason: SDUPTHER

## 2021-11-16 ENCOUNTER — TELEPHONE (OUTPATIENT)
Dept: INTERNAL MEDICINE | Facility: CLINIC | Age: 66
End: 2021-11-16

## 2021-11-16 ENCOUNTER — OFFICE VISIT (OUTPATIENT)
Dept: INTERNAL MEDICINE | Facility: CLINIC | Age: 66
End: 2021-11-16

## 2021-11-16 VITALS
BODY MASS INDEX: 32.25 KG/M2 | HEART RATE: 62 BPM | OXYGEN SATURATION: 97 % | DIASTOLIC BLOOD PRESSURE: 82 MMHG | HEIGHT: 68 IN | SYSTOLIC BLOOD PRESSURE: 140 MMHG | WEIGHT: 212.8 LBS | TEMPERATURE: 98.7 F | RESPIRATION RATE: 20 BRPM

## 2021-11-16 DIAGNOSIS — M65.332 TRIGGER MIDDLE FINGER OF LEFT HAND: ICD-10-CM

## 2021-11-16 DIAGNOSIS — J06.9 ACUTE URI: ICD-10-CM

## 2021-11-16 DIAGNOSIS — R07.9 CHEST PAIN, UNSPECIFIED TYPE: Primary | ICD-10-CM

## 2021-11-16 DIAGNOSIS — R94.31 ABNORMAL EKG: ICD-10-CM

## 2021-11-16 LAB
EXPIRATION DATE: NORMAL
FLUAV AG UPPER RESP QL IA.RAPID: NOT DETECTED
FLUBV AG UPPER RESP QL IA.RAPID: NOT DETECTED
INTERNAL CONTROL: NORMAL
Lab: NORMAL
SARS-COV-2 AG UPPER RESP QL IA.RAPID: NOT DETECTED

## 2021-11-16 PROCEDURE — 93000 ELECTROCARDIOGRAM COMPLETE: CPT | Performed by: NURSE PRACTITIONER

## 2021-11-16 PROCEDURE — 99214 OFFICE O/P EST MOD 30 MIN: CPT | Performed by: NURSE PRACTITIONER

## 2021-11-16 PROCEDURE — 87428 SARSCOV & INF VIR A&B AG IA: CPT | Performed by: NURSE PRACTITIONER

## 2021-11-16 RX ORDER — MULTIPLE VITAMINS W/ MINERALS TAB 9MG-400MCG
1 TAB ORAL DAILY
COMMUNITY

## 2021-11-16 NOTE — TELEPHONE ENCOUNTER
"Red rule verified and correct.      Pt c/o achy type pain to the center upper sternum ~ \"3rd rib down\". Pain increases with \"twisting and turning\".    + productive green cough yesterday only, + clear to yellow rhinitis.    Denies SOB, nausea, radiation of pain, sweatiness, palpatations.    B/P nml, 125/83    No fever or ear congestion.  Taking dayquil.    He has also had some numbness in fingers; he does sleep on his sides. \"Middle finger locks up\" but he can put it back in joint.    Appt made for today.  "

## 2021-11-16 NOTE — PROGRESS NOTES
"Chief Complaint  Chest Pain    Subjective          Durga Butts presents to [facility]  History of Present Illness  He reports having cough/congestion in the last 2 days. Symptoms improved some following using dayquil. He reports onset of chest discomfort this am about 10am. He states that this does not feel like it is his heart. He reports an aching-type pain. Denies chest pressure and heaviness. Denies radiation of pain. Denies soa. He reports having pain while sitting at his desk which has persisted without worsening. Denies soa, diaphoresis. Denies worsening of pain with cough/deep breathing. Denies fever, chills. He does report somebody aches, improved at this time    Middle digit on left hand stuck in flexed position when he wakes the last 2 morning. Improves after stretching hands and extension of middle finger    Objective   Vital Signs:   /82   Pulse 62   Temp 98.7 °F (37.1 °C)   Resp 20   Ht 172.7 cm (68\")   Wt 96.5 kg (212 lb 12.8 oz)   SpO2 97%   BMI 32.36 kg/m²     Physical Exam  Vitals and nursing note reviewed.   Constitutional:       General: He is not in acute distress.     Appearance: Normal appearance.   HENT:      Head: Normocephalic and atraumatic.      Right Ear: External ear normal.      Left Ear: External ear normal.      Nose: Nose normal.      Mouth/Throat:      Mouth: Mucous membranes are moist.   Eyes:      Conjunctiva/sclera: Conjunctivae normal.   Cardiovascular:      Rate and Rhythm: Normal rate and regular rhythm.      Pulses: Normal pulses.      Heart sounds: Normal heart sounds. No murmur heard.  No friction rub. No gallop.    Pulmonary:      Effort: Pulmonary effort is normal. No respiratory distress.      Breath sounds: Normal breath sounds. No wheezing, rhonchi or rales.      Comments: Chest pain not reproducible   Chest:      Chest wall: No tenderness.   Abdominal:      General: Abdomen is flat.      Palpations: " Abdomen is soft.   Musculoskeletal:      Cervical back: Neck supple.      Right lower leg: No edema.      Left lower leg: No edema.   Skin:     General: Skin is warm and dry.   Neurological:      General: No focal deficit present.      Mental Status: He is alert and oriented to person, place, and time.   Psychiatric:         Mood and Affect: Mood normal.         Behavior: Behavior normal.        Result Review :            ECG 12 Lead    Date/Time: 11/16/2021 1:45 PM  Performed by: Ramandeep Gray APRN  Authorized by: Ramandeep Gray APRN   Comparison: compared with previous ECG from 12/15/2020  Similar to previous ECG  Rhythm: sinus rhythm  Rate: normal  BPM: 61  Conduction: right bundle branch block  ST Segments: ST segments normal  QRS axis: normal  Other: no other findings    Clinical impression: abnormal EKG  Comments: Discussed with Dr. Garg.                Lab Results   Component Value Date    SARSANTIGEN Not Detected 11/16/2021    COVID19 NOT DETECTED 01/21/2021    FLUAAG Not Detected 11/16/2021    INR 0.96 (L) 12/15/2020     Assessment and Plan    Diagnoses and all orders for this visit:    1. Chest pain, unspecified type (Primary)  Assessment & Plan:  Discussed chest pain and EKG findings at length.   Discussed that EKG does not totally rule out acute coronary syndrome.  For where the work-up he would need to go to the ER.  Discussed low suspicion for acute coronary syndrome in the office today.  counseled that this may be related to current URI.  Discussed that EKG was unchanged from previous in December of last year.  However, it is abnormal and additional cardiac work-up is recommended.  We will get echo and stress test.  He will go to the ER with worsening chest pain or if he develops other symptoms of distress.    Orders:  -     ECG 12 Lead  -     POCT SARS-CoV-2 Antigen RUTH + Flu  -     Treadmill Stress Test; Future  -     Adult Transthoracic Echo Complete w/ Color, Spectral and Contrast if necessary  per protocol; Future    2. Trigger middle finger of left hand    3. Acute URI    4. Abnormal EKG  -     Treadmill Stress Test; Future  -     Adult Transthoracic Echo Complete w/ Color, Spectral and Contrast if necessary per protocol; Future    Other orders  -     ECG 12 Lead            Follow Up   Return if symptoms worsen or fail to improve.  Patient was given instructions and counseling regarding his condition or for health maintenance advice. Please see specific information pulled into the AVS if appropriate.

## 2021-11-17 PROBLEM — R07.9 CHEST PAIN: Chronic | Status: ACTIVE | Noted: 2021-11-17

## 2021-11-17 PROBLEM — M65.332 TRIGGER MIDDLE FINGER OF LEFT HAND: Status: ACTIVE | Noted: 2021-11-17

## 2021-11-17 PROBLEM — J06.9 ACUTE URI: Status: ACTIVE | Noted: 2021-11-17

## 2021-11-17 PROBLEM — R07.9 CHEST PAIN: Status: ACTIVE | Noted: 2021-11-17

## 2021-11-17 PROBLEM — J06.9 ACUTE URI: Chronic | Status: ACTIVE | Noted: 2021-11-17

## 2021-11-17 NOTE — ASSESSMENT & PLAN NOTE
Discussed chest pain and EKG findings at length.   Discussed that EKG does not totally rule out acute coronary syndrome.  For where the work-up he would need to go to the ER.  Discussed low suspicion for acute coronary syndrome in the office today.  counseled that this may be related to current URI.  Discussed that EKG was unchanged from previous in December of last year.  However, it is abnormal and additional cardiac work-up is recommended.  We will get echo and stress test.  He will go to the ER with worsening chest pain or if he develops other symptoms of distress.

## 2021-12-21 ENCOUNTER — OFFICE VISIT (OUTPATIENT)
Dept: INTERNAL MEDICINE | Facility: CLINIC | Age: 66
End: 2021-12-21

## 2021-12-21 VITALS
HEIGHT: 68 IN | WEIGHT: 221.2 LBS | OXYGEN SATURATION: 97 % | DIASTOLIC BLOOD PRESSURE: 80 MMHG | TEMPERATURE: 97 F | RESPIRATION RATE: 18 BRPM | BODY MASS INDEX: 33.52 KG/M2 | HEART RATE: 74 BPM | SYSTOLIC BLOOD PRESSURE: 144 MMHG

## 2021-12-21 DIAGNOSIS — M65.332 TRIGGER MIDDLE FINGER OF LEFT HAND: ICD-10-CM

## 2021-12-21 DIAGNOSIS — R07.9 CHEST PAIN, UNSPECIFIED TYPE: Primary | ICD-10-CM

## 2021-12-21 DIAGNOSIS — I10 PRIMARY HYPERTENSION: Chronic | ICD-10-CM

## 2021-12-21 PROCEDURE — 99214 OFFICE O/P EST MOD 30 MIN: CPT | Performed by: NURSE PRACTITIONER

## 2021-12-21 NOTE — PROGRESS NOTES
"Chief Complaint  Follow-up and Hyperlipidemia    Subjective          Durga Butts presents to [facility]  History of Present Illness     Patient verbalized consent to the visit recording.    Mr. Mederos presents to the office for follow-up evaluation for recent upper respiratory infection with chest pain. He has a stress test scheduled for 12/28/2021 and an echocardiogram scheduled on 02/08/2022. Today, he denies experiencing any episodes of chest pain since his last visit. He continues to experience his nasal passages becoming dry, but attributes this to the cold weather. He has been turning on his heat in the house. Additionally, he continues to experience ear itchiness, but denies any pain.    Finger stiffness.  He complains of continued finger stiffness and pain, which is most bothersome in the morning. His symptoms improve with use of Aleve.     Blood pressure.  He forgot to take his blood pressure medication this morning and measured at 145/94 mmHg. His blood pressure measured 120/78 mmHg on 12/16/2021.     Objective    Review of Systems   HENT: Positive for congestion.         +Ear itchiness.   Musculoskeletal:        +Finger stiffness and pain.     Vital Signs:   /80 (BP Location: Right arm, Patient Position: Sitting)   Pulse 74   Temp 97 °F (36.1 °C)   Resp 18   Ht 172.7 cm (68\")   Wt 100 kg (221 lb 3.2 oz)   SpO2 97%   BMI 33.63 kg/m²     Physical Exam  Vitals and nursing note reviewed.   Constitutional:       General: He is not in acute distress.     Appearance: Normal appearance.   HENT:      Head: Normocephalic and atraumatic.      Right Ear: External ear normal.      Left Ear: External ear normal.      Nose: Nose normal.      Mouth/Throat:      Mouth: Mucous membranes are moist.   Eyes:      Conjunctiva/sclera: Conjunctivae normal.   Cardiovascular:      Rate and Rhythm: Normal rate and regular rhythm.      Pulses: Normal pulses.      " Heart sounds: Normal heart sounds. No murmur heard.  No friction rub. No gallop.    Pulmonary:      Effort: Pulmonary effort is normal. No respiratory distress.      Breath sounds: No wheezing, rhonchi or rales.   Musculoskeletal:      Cervical back: Neck supple.   Skin:     General: Skin is warm and dry.   Neurological:      General: No focal deficit present.      Mental Status: He is alert and oriented to person, place, and time.   Psychiatric:         Mood and Affect: Mood normal.         Behavior: Behavior normal.        Result Review :                 Assessment and Plan    Diagnoses and all orders for this visit:    1. Chest pain, unspecified type (Primary)         -    He may follow up with stress testing as scheduled on 12/28/2021 and echocardiogram on 02/08/2022.     2. Trigger middle finger of left hand         -    We discussed pursuing trigger point injections for his symptoms, but he politely declined. He may continue to use Aleve as needed for pain relief.     3. Primary hypertension         -    He was encouraged to continue to take his medication and monitor his blood pressure.     The patient may follow up in 6-8 weeks for re-evaluation. At that time, we will review his recent studies.      Follow Up   Return in about 2 months (around 2/21/2022).  Patient was given instructions and counseling regarding his condition or for health maintenance advice. Please see specific information pulled into the AVS if appropriate.     Transcribed from ambient dictation for WARREN Leary by Mary Kate Phillips.  12/21/21   14:09 EST

## 2021-12-28 ENCOUNTER — HOSPITAL ENCOUNTER (OUTPATIENT)
Dept: CARDIOLOGY | Facility: HOSPITAL | Age: 66
Discharge: HOME | End: 2021-12-28
Admitting: NURSE PRACTITIONER

## 2021-12-28 VITALS — BODY MASS INDEX: 32.74 KG/M2 | HEIGHT: 68 IN | WEIGHT: 216 LBS

## 2021-12-28 DIAGNOSIS — R07.9 CHEST PAIN, UNSPECIFIED TYPE: ICD-10-CM

## 2021-12-28 DIAGNOSIS — R94.31 ABNORMAL EKG: ICD-10-CM

## 2021-12-28 PROCEDURE — 93017 CV STRESS TEST TRACING ONLY: CPT

## 2021-12-28 NOTE — DISCHARGE INSTRUCTIONS
• Ordering physicians/PCP will contact you with results.  • Follow up with PCP/ordering physician as scheduled or as needed.  • If chest pain gets worse, lasts longer, and/or doesn't go away by itself we recommend calling 911 or going to the nearest emergency room.  • Resume activity as tolerated.  • Continue prescribed medications as ordered.

## 2022-01-01 LAB
BH CV IMMEDIATE POST RECOVERY TECH DATA SYMPTOMS: NORMAL
BH CV IMMEDIATE POST TECH DATA BLOOD PRESSURE: NORMAL MMHG
BH CV IMMEDIATE POST TECH DATA HEART RATE: 129 BPM
BH CV NINE MINUTE RECOVERY TECH DATA BLOOD PRESSURE: NORMAL MMHG
BH CV NINE MINUTE RECOVERY TECH DATA HEART RATE: 88 BPM
BH CV NINE MINUTE RECOVERY TECH DATA SYMPTOMS: NORMAL
BH CV SIX MINUTE RECOVERY TECH DATA BLOOD PRESSURE: NORMAL
BH CV SIX MINUTE RECOVERY TECH DATA HEART RATE: 92 BPM
BH CV SIX MINUTE RECOVERY TECH DATA SYMPTOMS: NORMAL
BH CV STRESS BP STAGE 1: NORMAL
BH CV STRESS BP STAGE 2: NORMAL
BH CV STRESS BP STAGE 3: NORMAL
BH CV STRESS DURATION MIN STAGE 1: 3
BH CV STRESS DURATION MIN STAGE 2: 3
BH CV STRESS DURATION MIN STAGE 3: 3
BH CV STRESS DURATION MIN STAGE 4: 1
BH CV STRESS DURATION SEC STAGE 1: 0
BH CV STRESS DURATION SEC STAGE 2: 0
BH CV STRESS DURATION SEC STAGE 3: 0
BH CV STRESS DURATION SEC STAGE 4: 56
BH CV STRESS GRADE STAGE 1: 10
BH CV STRESS GRADE STAGE 2: 12
BH CV STRESS GRADE STAGE 3: 14
BH CV STRESS GRADE STAGE 4: 16
BH CV STRESS HR STAGE 1: 95
BH CV STRESS HR STAGE 2: 112
BH CV STRESS HR STAGE 3: 130
BH CV STRESS HR STAGE 4: 171
BH CV STRESS METS STAGE 1: 5
BH CV STRESS METS STAGE 2: 7.5
BH CV STRESS METS STAGE 3: 10
BH CV STRESS METS STAGE 4: 13.5
BH CV STRESS PROTOCOL 1: NORMAL
BH CV STRESS RECOVERY BP: NORMAL MMHG
BH CV STRESS RECOVERY HR: 90 BPM
BH CV STRESS SPEED STAGE 1: 1.7
BH CV STRESS SPEED STAGE 2: 2.5
BH CV STRESS SPEED STAGE 3: 3.4
BH CV STRESS SPEED STAGE 4: 4.2
BH CV STRESS STAGE 1: 1
BH CV STRESS STAGE 2: 2
BH CV STRESS STAGE 3: 3
BH CV STRESS STAGE 4: 4
BH CV THREE MINUTE POST TECH DATA BLOOD PRESSURE: NORMAL MMHG
BH CV THREE MINUTE POST TECH DATA HEART RATE: 98 BPM
BH CV TWELVE MINUTE RECOVERY TECH DATA BLOOD PRESSURE: NORMAL MMHG
BH CV TWELVE MINUTE RECOVERY TECH DATA HEART RATE: 90 BPM
BH CV TWELVE MINUTE RECOVERY TECH DATA SYMPTOMS: NORMAL
MAXIMAL PREDICTED HEART RATE: 153 BPM
PERCENT MAX PREDICTED HR: 111.76 %
STRESS BASELINE BP: NORMAL MMHG
STRESS BASELINE HR: 53 BPM
STRESS PERCENT HR: 131 %
STRESS POST ESTIMATED WORKLOAD: 11.7 METS
STRESS POST EXERCISE DUR MIN: 9 MIN
STRESS POST EXERCISE DUR SEC: 56 SEC
STRESS POST PEAK BP: NORMAL MMHG
STRESS POST PEAK HR: 171 BPM
STRESS TARGET HR: 130 BPM

## 2022-02-08 ENCOUNTER — HOSPITAL ENCOUNTER (OUTPATIENT)
Dept: CARDIOLOGY | Facility: HOSPITAL | Age: 67
Discharge: HOME | End: 2022-02-08
Admitting: NURSE PRACTITIONER

## 2022-02-08 DIAGNOSIS — R07.9 CHEST PAIN, UNSPECIFIED TYPE: ICD-10-CM

## 2022-02-08 DIAGNOSIS — R94.31 ABNORMAL EKG: ICD-10-CM

## 2022-02-08 LAB
ASCENDING AORTA: 3.9 CM
BH CV ECHO MEAS - AO ROOT DIAM: 3.5 CM
BH CV ECHO MEAS - EDV(MOD-SP2): 63 ML
BH CV ECHO MEAS - EDV(MOD-SP4): 86 ML
BH CV ECHO MEAS - EF(MOD-BP): 59 %
BH CV ECHO MEAS - ESV(MOD-SP2): 28 ML
BH CV ECHO MEAS - ESV(MOD-SP4): 33 ML
BH CV ECHO MEAS - IVSD: 1.2 CM
BH CV ECHO MEAS - LA DIMENSION(2D): 4.3 CM
BH CV ECHO MEAS - LAT PEAK E' VEL: 8.5 CM/SEC
BH CV ECHO MEAS - LVIDD: 5.2 CM
BH CV ECHO MEAS - LVIDS: 3.2 CM
BH CV ECHO MEAS - LVOT DIAM: 2 CM
BH CV ECHO MEAS - LVPWD: 0.8 CM
BH CV ECHO MEAS - MED PEAK E' VEL: 8.49 CM/SEC
BH CV ECHO MEAS - MV A MAX VEL: 52 CM/SEC
BH CV ECHO MEAS - MV DEC TIME: 206 MSEC
BH CV ECHO MEAS - MV E MAX VEL: 78 CM/SEC
BH CV ECHO MEAS - MV E/A: 1.5
BH CV ECHO MEAS - RAP SYSTOLE: 3 MMHG
BH CV ECHO MEAS - RVDD: 2.7 CM
BH CV ECHO MEAS - RVSP: 19 MMHG
BH CV ECHO MEAS - TR MAX PG: 16 MMHG
BH CV ECHO MEAS - TR MAX VEL: 202 CM/SEC
BH CV ECHO MEASUREMENTS AVERAGE E/E' RATIO: 9.18
IVRT: 100 MSEC
LEFT ATRIUM VOLUME INDEX: 30.3 ML/M2
MAXIMAL PREDICTED HEART RATE: 153 BPM
STRESS TARGET HR: 130 BPM

## 2022-02-08 PROCEDURE — 93306 TTE W/DOPPLER COMPLETE: CPT

## 2022-02-22 ENCOUNTER — OFFICE VISIT (OUTPATIENT)
Dept: INTERNAL MEDICINE | Facility: CLINIC | Age: 67
End: 2022-02-22

## 2022-02-22 VITALS
BODY MASS INDEX: 33.68 KG/M2 | SYSTOLIC BLOOD PRESSURE: 130 MMHG | TEMPERATURE: 97.1 F | RESPIRATION RATE: 18 BRPM | DIASTOLIC BLOOD PRESSURE: 78 MMHG | OXYGEN SATURATION: 97 % | HEIGHT: 68 IN | WEIGHT: 222.2 LBS | HEART RATE: 59 BPM

## 2022-02-22 DIAGNOSIS — K52.9 GASTROENTERITIS: Primary | ICD-10-CM

## 2022-02-22 PROCEDURE — 99213 OFFICE O/P EST LOW 20 MIN: CPT | Performed by: NURSE PRACTITIONER

## 2022-02-22 NOTE — PROGRESS NOTES
"Chief Complaint  Diarrhea (1 Week)    Subjective          Durga Butts presents to [facility]  History of Present Illness  Diarrhea started about 5 days ago. He reports body aches and chills the following day. He reports some improvement with clear liquid diet. He reports last 2  Days have improvement. He reports almost normal bowel movement today. He reports possible food poisoning on Wednesday of last week.  He reports that he is resume normal eating habits in the last 2 days.  Denies belly pain at this time.  Objective   Vital Signs:   /78 (BP Location: Right arm, Patient Position: Sitting, Cuff Size: Adult)   Pulse 59   Temp 97.1 °F (36.2 °C)   Resp 18   Ht 172.7 cm (68\")   Wt 101 kg (222 lb 3.2 oz)   SpO2 97%   BMI 33.79 kg/m²     Physical Exam  Vitals and nursing note reviewed.   Constitutional:       General: He is not in acute distress.     Appearance: Normal appearance.   HENT:      Head: Normocephalic and atraumatic.      Right Ear: External ear normal.      Left Ear: External ear normal.      Nose: Nose normal.      Mouth/Throat:      Mouth: Mucous membranes are moist.   Eyes:      Conjunctiva/sclera: Conjunctivae normal.   Cardiovascular:      Rate and Rhythm: Normal rate and regular rhythm.      Pulses: Normal pulses.      Heart sounds: Normal heart sounds. No murmur heard.  No friction rub. No gallop.    Pulmonary:      Effort: Pulmonary effort is normal. No respiratory distress.      Breath sounds: No wheezing, rhonchi or rales.   Abdominal:      General: There is no distension.      Palpations: Abdomen is soft.      Tenderness: There is no abdominal tenderness. There is no guarding.   Musculoskeletal:      Cervical back: Neck supple.   Skin:     General: Skin is warm and dry.   Neurological:      General: No focal deficit present.      Mental Status: He is alert and oriented to person, place, and time.   Psychiatric:         Mood and " Affect: Mood normal.         Behavior: Behavior normal.        Result Review :          Procedures      Assessment and Plan    Diagnoses and all orders for this visit:    1. Gastroenteritis (Primary)  Assessment & Plan:  Discussed likely viral gastroenteritis.  Push fluids/Gatorade.  Monitor urine output.  Brat diet, meds as tolerated.  Discussed importance of hand hygiene.  Call if symptoms worsen or do not continue to improve.              Follow Up   No follow-ups on file.  Patient was given instructions and counseling regarding his condition or for health maintenance advice. Please see specific information pulled into the AVS if appropriate.

## 2022-02-22 NOTE — ASSESSMENT & PLAN NOTE
Discussed likely viral gastroenteritis.  Push fluids/Gatorade.  Monitor urine output.  Brat diet, meds as tolerated.  Discussed importance of hand hygiene.  Call if symptoms worsen or do not continue to improve.

## 2022-03-14 ENCOUNTER — OFFICE VISIT (OUTPATIENT)
Dept: INTERNAL MEDICINE | Facility: CLINIC | Age: 67
End: 2022-03-14

## 2022-03-14 VITALS
TEMPERATURE: 97.8 F | HEART RATE: 52 BPM | DIASTOLIC BLOOD PRESSURE: 82 MMHG | SYSTOLIC BLOOD PRESSURE: 125 MMHG | OXYGEN SATURATION: 99 %

## 2022-03-14 DIAGNOSIS — S81.812A LACERATION OF LEFT LOWER EXTREMITY, INITIAL ENCOUNTER: Primary | ICD-10-CM

## 2022-03-14 DIAGNOSIS — M65.332 TRIGGER MIDDLE FINGER OF LEFT HAND: ICD-10-CM

## 2022-03-14 PROCEDURE — 99213 OFFICE O/P EST LOW 20 MIN: CPT | Performed by: NURSE PRACTITIONER

## 2022-03-14 PROCEDURE — 90471 IMMUNIZATION ADMIN: CPT | Performed by: NURSE PRACTITIONER

## 2022-03-14 PROCEDURE — 90715 TDAP VACCINE 7 YRS/> IM: CPT | Performed by: NURSE PRACTITIONER

## 2022-03-14 RX ORDER — CEPHALEXIN 500 MG/1
500 CAPSULE ORAL 4 TIMES DAILY
Qty: 28 CAPSULE | Refills: 0 | Status: SHIPPED | OUTPATIENT
Start: 2022-03-14 | End: 2022-03-21
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
## 2022-03-14 NOTE — ASSESSMENT & PLAN NOTE
Will treat with Keflex at this time to treat potential cellulitis. Tdap administered today. Patient to monitor closely for worsening erythema, edema, warmth, streaking, calf pain/erythema/swelling. Patient deferred imaging but will call clinic if symptoms do not improve. Low threshold for xray due to direct trauma. Patient voices understanding and is agreeable to plan.

## 2022-03-16 ENCOUNTER — OFFICE VISIT (OUTPATIENT)
Dept: ORTHOPEDIC SURGERY | Facility: CLINIC | Age: 67
End: 2022-03-16

## 2022-03-16 VITALS — WEIGHT: 217 LBS | OXYGEN SATURATION: 97 % | HEART RATE: 62 BPM | BODY MASS INDEX: 32.89 KG/M2 | HEIGHT: 68 IN

## 2022-03-16 DIAGNOSIS — M65.332 TRIGGER MIDDLE FINGER OF LEFT HAND: Primary | ICD-10-CM

## 2022-03-16 PROCEDURE — 99203 OFFICE O/P NEW LOW 30 MIN: CPT | Performed by: STUDENT IN AN ORGANIZED HEALTH CARE EDUCATION/TRAINING PROGRAM

## 2022-03-16 PROCEDURE — 20550 NJX 1 TENDON SHEATH/LIGAMENT: CPT | Performed by: STUDENT IN AN ORGANIZED HEALTH CARE EDUCATION/TRAINING PROGRAM

## 2022-03-16 RX ADMIN — LIDOCAINE HYDROCHLORIDE 1 ML: 10 INJECTION, SOLUTION INFILTRATION; PERINEURAL at 14:03

## 2022-03-16 RX ADMIN — TRIAMCINOLONE ACETONIDE 40 MG: 40 INJECTION, SUSPENSION INTRA-ARTICULAR; INTRAMUSCULAR at 14:03

## 2022-03-16 NOTE — PROGRESS NOTES
"Chief Complaint  Pain of the Left Hand    Subjective          Durga Butts presents to [facility] for   History of Present Illness    The patient presents here today for evaluation of the left hand. The patient reports triggering and locking to the left middle finger for about 3 months. He has no specific injury or trauma. He reports it is painful at times. He denies numbness and tingling. He has no other complaints today.     No Known Allergies     Social History     Socioeconomic History   • Marital status:    Tobacco Use   • Smoking status: Never Smoker   • Smokeless tobacco: Never Used   Vaping Use   • Vaping Use: Never used   Substance and Sexual Activity   • Alcohol use: Not Currently   • Drug use: Defer   • Sexual activity: Defer        I reviewed the patient's chief complaint, history of present illness, review of systems, past medical history, surgical history, family history, social history, medications, and allergy list.     REVIEW OF SYSTEMS    Constitutional: Denies fevers, chills, weight loss  Cardiovascular: Denies chest pain, shortness of breath  Skin: Denies rashes, acute skin changes  Neurologic: Denies headache, loss of consciousness  MSK: Left hand pain      Objective   Vital Signs:   Pulse 62   Ht 172.7 cm (68\")   Wt 98.4 kg (217 lb)   SpO2 97%   BMI 32.99 kg/m²     Body mass index is 32.99 kg/m².    Physical Exam    General: Alert. No acute distress.   Left hand- Active triggering of the left middle finger. palpable nodule over the A- pulley. No wounds.non-tender to A-1 pulley. Full finger ROM no triggering to other fingers. Sensation to light touch intact in the median, radial, ulnar nerve distributions. Positive AIN, PIN, ulnar nerve motor function. Positive pulses.    Small Joint Arthrocentesis  Consent given by: patient  Site marked: site marked  Timeout: Immediately prior to procedure a time out was called to verify the correct patient, " procedure, equipment, support staff and site/side marked as required   Supporting Documentation  Indications: pain   Procedure Details  Location: long finger (Left trigger) -   Preparation: Patient was prepped and draped in the usual sterile fashion  Needle gauge: 23 G.  Medications administered: 1 mL lidocaine 1 %; 40 mg triamcinolone acetonide 40 MG/ML  Patient tolerance: patient tolerated the procedure well with no immediate complications          Imaging Results (Most Recent)     None                   Assessment and Plan    Diagnoses and all orders for this visit:    1. Trigger middle finger of left hand (Primary)        Discussed the treatment options with the patient, operative vs non-operative. Discussed the risks and benefits of a left middle finger trigger finger injection vs left middle finger trigger finger release. The patient expressed understanding and wished to proceed with left middle finger trigger finger steroid injection. He tolerated the injection well.     Call or return if symptoms worsen or patient has any concerns.       Scribed for Dennis Castorena MD by Amita Kenney  03/16/2022   13:53 EDT         Follow Up   Return in about 4 weeks (around 4/13/2022).  Patient was given instructions and counseling regarding his condition or for health maintenance advice. Please see specific information pulled into the AVS if appropriate.       I have personally performed the services described in this document as scribed by the above individual and it is both accurate and complete.     Dennis Castorena MD  03/16/22  13:58 EDT

## 2022-03-17 RX ORDER — TRIAMCINOLONE ACETONIDE 40 MG/ML
40 INJECTION, SUSPENSION INTRA-ARTICULAR; INTRAMUSCULAR
Status: COMPLETED | OUTPATIENT
Start: 2022-03-16 | End: 2022-03-16

## 2022-03-17 RX ORDER — LIDOCAINE HYDROCHLORIDE 10 MG/ML
1 INJECTION, SOLUTION INFILTRATION; PERINEURAL
Status: COMPLETED | OUTPATIENT
Start: 2022-03-16 | End: 2022-03-16

## 2022-04-15 ENCOUNTER — OFFICE VISIT (OUTPATIENT)
Dept: ORTHOPEDIC SURGERY | Facility: CLINIC | Age: 67
End: 2022-04-15

## 2022-04-15 VITALS — HEART RATE: 68 BPM | OXYGEN SATURATION: 97 % | HEIGHT: 68 IN | WEIGHT: 217 LBS | BODY MASS INDEX: 32.89 KG/M2

## 2022-04-15 DIAGNOSIS — M65.332 TRIGGER MIDDLE FINGER OF LEFT HAND: Primary | ICD-10-CM

## 2022-04-15 PROCEDURE — 99213 OFFICE O/P EST LOW 20 MIN: CPT | Performed by: STUDENT IN AN ORGANIZED HEALTH CARE EDUCATION/TRAINING PROGRAM

## 2022-04-15 NOTE — PROGRESS NOTES
"Chief Complaint  Follow-up of the Left Hand    Subjective          Durga Butts presents to [facility] for   History of Present Illness    Durga returns today for follow-up of his left third trigger finger.  He had an injection at his previous visit.  He reports significant improvement in his pain and function since that time.  He denies any catching or locking.  He can still feel the nodule in his finger but reports no pain and full function with the hand.  No Known Allergies     Social History     Socioeconomic History   • Marital status:    Tobacco Use   • Smoking status: Never Smoker   • Smokeless tobacco: Never Used   Vaping Use   • Vaping Use: Never used   Substance and Sexual Activity   • Alcohol use: Not Currently   • Drug use: Defer   • Sexual activity: Defer        I reviewed the patient's chief complaint, history of present illness, review of systems, past medical history, surgical history, family history, social history, medications, and allergy list.     REVIEW OF SYSTEMS    Constitutional: Denies fevers, chills, weight loss  Cardiovascular: Denies chest pain, shortness of breath  Skin: Denies rashes, acute skin changes  Neurologic: Denies headache, loss of consciousness  MSK: Left hand pain      Objective   Vital Signs:   Pulse 68   Ht 172.7 cm (68\")   Wt 98.4 kg (217 lb)   SpO2 97%   BMI 32.99 kg/m²     Body mass index is 32.99 kg/m².    Physical Exam    General: Alert. No acute distress.   Left upper extremity: Palpable nodule over the flexor tendon of the left third finger.  No triggering with motion.  No locking.  Full finger range of motion.  Neurovascularly intact.    Procedures    Imaging Results (Most Recent)     None                   Assessment and Plan    Diagnoses and all orders for this visit:    1. Trigger middle finger of left hand (Primary)        Durga has done well after a left third trigger finger injection.  He has full function and " no pain in the hand.  He will continue to monitor his symptoms.  He may call as needed going forward.  We may consider repeat injection or surgical treatment if indicated.    Call or return if symptoms worsen or patient has any concerns.        Scribed for Dennis Castorena MD by Dennis Castorena MD  04/15/2022   08:21 EDT         Follow Up   Return if symptoms worsen or fail to improve.  Patient was given instructions and counseling regarding his condition or for health maintenance advice. Please see specific information pulled into the AVS if appropriate.       I have personally performed the services described in this document as scribed by the above individual and it is both accurate and complete.     Dennis Castorena MD  04/15/22  08:30 EDT

## 2022-07-08 ENCOUNTER — TELEPHONE (OUTPATIENT)
Dept: INTERNAL MEDICINE | Facility: CLINIC | Age: 67
End: 2022-07-08

## 2022-07-08 NOTE — TELEPHONE ENCOUNTER
Red rule verified and correct.    Pt thinks he broke a rib.    Pain deep in L breast/chest area.    Pt was stretching to get a fuse in the R floor board of the truck.    Sudden onset pain when contorting himself.    Pain increased with deep breath and palpation/movement.    Ibuprofen did help.    No appts and imaging down in office.    Recommended UC; pt ok with going.

## 2022-07-20 ENCOUNTER — OFFICE VISIT (OUTPATIENT)
Dept: INTERNAL MEDICINE | Facility: CLINIC | Age: 67
End: 2022-07-20

## 2022-07-20 VITALS
OXYGEN SATURATION: 95 % | TEMPERATURE: 97.6 F | SYSTOLIC BLOOD PRESSURE: 128 MMHG | HEART RATE: 68 BPM | BODY MASS INDEX: 34.65 KG/M2 | HEIGHT: 68 IN | WEIGHT: 228.6 LBS | DIASTOLIC BLOOD PRESSURE: 72 MMHG
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
## 2022-07-20 DIAGNOSIS — M79.675 GREAT TOE PAIN, LEFT: Primary | ICD-10-CM

## 2022-07-20 DIAGNOSIS — S30.861A TICK BITE OF ABDOMINAL WALL, INITIAL ENCOUNTER: ICD-10-CM

## 2022-07-20 PROCEDURE — 99213 OFFICE O/P EST LOW 20 MIN: CPT | Performed by: NURSE PRACTITIONER

## 2022-07-20 NOTE — PROGRESS NOTES
"Chief Complaint  Insect Bite (TICK BITE. 4 AM. Right side.) and Toe Pain (Left big toe. Thinks that he may have broke it again)    Subjective         Durga Butts presents to [facility] for Concerns regarding a recent tick bite and left great toe pain.    Patient reports that Saturday he was working in his yard, he believes a tick crawled on to him that day, early morning on Sunday, approximately 4 AM he felt a tick on his right torso.  He was able to remove the tick completely, he does not feel like the tick was substantially embedded.  He reports that he has been using some over-the-counter insect bite cream, which has been helpful.  He does not report any significant itching or pain, he does note redness around the site.  Denies any bull's-eye type rash.  He has been bitten by ticks in the past, he typically has a significant reaction from them, noting that he still has some remnants of previous bites on his legs.  He denies any other significant symptoms like fatigue, headache, blurred vision, or fever.    Patient also concerned about left great toe pain, he does not report any significant injury in the last several weeks, but is noting that pain is present and stiffness.  Patient reports that about 2 years ago he did have a fracture of the left great toe, it was fractured in 2 places.  He did go through rehab, and states that overall he recovered quite well.  He is concerned that it is possibly related to the old fractures.  Requesting x-ray today.         Review of Systems    Objective   Vital Signs:   /72 (BP Location: Left arm, Patient Position: Sitting, Cuff Size: Large Adult)   Pulse 68   Temp 97.6 °F (36.4 °C) (Temporal)   Ht 172.7 cm (68\")   Wt 104 kg (228 lb 9.6 oz)   SpO2 95%   BMI 34.76 kg/m²     Physical Exam  Vitals and nursing note reviewed.   Constitutional:       Appearance: Normal appearance.   HENT:      Head: Normocephalic and atraumatic. "   Pulmonary:      Effort: Pulmonary effort is normal.   Musculoskeletal:      Comments: Pain and tenderness noted to the left great toe.  No redness, no swelling   Skin:     General: Skin is warm and dry.      Comments: Redness noted to the right lower lateral torso, central bite dank noted, no bull's-eye rash noted.   Neurological:      Mental Status: He is alert.   Psychiatric:         Mood and Affect: Mood normal.         Thought Content: Thought content normal.        Result Review :                   Diagnoses and all orders for this visit:    1. Great toe pain, left (Primary)  Assessment & Plan:  X-ray performed, no significant abnormal findings.  Treat with ibuprofen for for the next several days, if no improvement, we could look at obtaining labs for possible gout, though not very likely.    Orders:  -     XR Foot 3+ View Left    2. Tick bite of abdominal wall, initial encounter  Assessment & Plan:  Redness noted to the tick bite, otherwise reassuring physical exam.  Tick was likely on him for less than 24 hours, so not overly concerning for tickborne illnesses.  Continue to monitor, if symptoms develop, advise close follow-up.          Follow Up   Return if symptoms worsen or fail to improve.  Patient was given instructions and counseling regarding his condition or for health maintenance advice. Please see specific information pulled into the AVS if appropriate.     WARREN Staley  7/20/2022  This note was electronically signed.

## 2022-07-20 NOTE — ASSESSMENT & PLAN NOTE
X-ray performed, no significant abnormal findings.  Treat with ibuprofen for for the next several days, if no improvement, we could look at obtaining labs for possible gout, though not very likely.

## 2022-07-20 NOTE — ASSESSMENT & PLAN NOTE
Redness noted to the tick bite, otherwise reassuring physical exam.  Tick was likely on him for less than 24 hours, so not overly concerning for tickborne illnesses.  Continue to monitor, if symptoms develop, advise close follow-up.

## 2022-10-27 ENCOUNTER — OFFICE VISIT (OUTPATIENT)
Dept: INTERNAL MEDICINE | Facility: CLINIC | Age: 67
End: 2022-10-27

## 2022-10-27 VITALS
BODY MASS INDEX: 35.19 KG/M2 | TEMPERATURE: 98 F | OXYGEN SATURATION: 98 % | HEIGHT: 68 IN | SYSTOLIC BLOOD PRESSURE: 126 MMHG | WEIGHT: 232.2 LBS | DIASTOLIC BLOOD PRESSURE: 84 MMHG | HEART RATE: 65 BPM

## 2022-10-27 DIAGNOSIS — E78.5 HYPERLIPIDEMIA, UNSPECIFIED HYPERLIPIDEMIA TYPE: Primary | Chronic | ICD-10-CM

## 2022-10-27 DIAGNOSIS — M65.342 TRIGGER RING FINGER OF LEFT HAND: ICD-10-CM

## 2022-10-27 DIAGNOSIS — I10 PRIMARY HYPERTENSION: Chronic | ICD-10-CM

## 2022-10-27 DIAGNOSIS — R53.83 OTHER FATIGUE: ICD-10-CM

## 2022-10-27 PROCEDURE — 99214 OFFICE O/P EST MOD 30 MIN: CPT | Performed by: NURSE PRACTITIONER

## 2022-10-27 NOTE — PROGRESS NOTES
"Chief Complaint  Hand Pain (Left ring finger. Pt stated trigger finger. )    Subjective        Durga Butts presents to [facility] for History of Present Illness  Concerns for hand pain, primarily left ring finger, feels like trigger finger, has had issues with that in the past in other digits, would like referral to orthopedics.  Follow-up for fatigue, would like to have lab work performed, namely testosterone serially.  Hypertension and hyperlipidemia, well controlled with medication currently.  Needs labs.       Objective   Vital Signs:   /84   Pulse 65   Temp 98 °F (36.7 °C)   Ht 172.7 cm (68\")   Wt 105 kg (232 lb 3.2 oz)   SpO2 98%   BMI 35.31 kg/m²     Physical Exam  Vitals and nursing note reviewed.   Constitutional:       Appearance: Normal appearance.   HENT:      Head: Normocephalic and atraumatic.      Nose: Nose normal.      Mouth/Throat:      Mouth: Mucous membranes are moist.   Eyes:      Pupils: Pupils are equal, round, and reactive to light.   Pulmonary:      Effort: Pulmonary effort is normal.   Neurological:      Mental Status: He is alert.   Psychiatric:         Mood and Affect: Mood normal.         Thought Content: Thought content normal.        Result Review :  {The following data was reviewed by WARREN Staley on 10/27/22                Diagnoses and all orders for this visit:    1. Hyperlipidemia, unspecified hyperlipidemia type (Primary)  -     Lipid Panel    2. Primary hypertension  -     Comprehensive Metabolic Panel  -     CBC & Differential    3. Other fatigue  -     TSH  -     Testosterone, Free, Total  -     Testosterone, Free, Total; Future    4. Trigger ring finger of left hand  -     Ambulatory Referral to Orthopedic Surgery    I have placed orders for labs, patient will come in and have those done tomorrow morning, and then serial testosterone checks will need to be obtained.  Orders placed.  Patient will be referred back to " orthopedics for his trigger finger, has seen Dr. Dave in the past.  Cholesterol levels will be checked, medication is doing well, no need for refills today.  Follow-up based on lab work.      Follow Up   Return if symptoms worsen or fail to improve.  Patient was given instructions and counseling regarding his condition or for health maintenance advice. Please see specific information pulled into the AVS if appropriate.     WARREN Staley  10/27/2022  This note was electronically signed.

## 2022-11-09 ENCOUNTER — OFFICE VISIT (OUTPATIENT)
Dept: ORTHOPEDIC SURGERY | Facility: CLINIC | Age: 67
End: 2022-11-09

## 2022-11-09 VITALS — OXYGEN SATURATION: 98 % | BODY MASS INDEX: 34.4 KG/M2 | HEART RATE: 66 BPM | WEIGHT: 227 LBS | HEIGHT: 68 IN

## 2022-11-09 DIAGNOSIS — M65.342 TRIGGER RING FINGER OF LEFT HAND: Primary | ICD-10-CM

## 2022-11-09 PROCEDURE — 20550 NJX 1 TENDON SHEATH/LIGAMENT: CPT | Performed by: STUDENT IN AN ORGANIZED HEALTH CARE EDUCATION/TRAINING PROGRAM

## 2022-11-09 RX ADMIN — LIDOCAINE HYDROCHLORIDE 1 ML: 10 INJECTION, SOLUTION INFILTRATION; PERINEURAL at 08:40

## 2022-11-09 RX ADMIN — TRIAMCINOLONE ACETONIDE 40 MG: 40 INJECTION, SUSPENSION INTRA-ARTICULAR; INTRAMUSCULAR at 08:40

## 2022-11-09 NOTE — PROGRESS NOTES
"Chief Complaint  Initial Evaluation and Pain of the Left Hand    Subjective          Durga Butts presents to [facility] for   History of Present Illness    The patient presents here today for follow up evaluation of the left hand. The patient was previously treated his left middle finger trigger finger conservatively. He reports the injection gave him relief. He is now having the same symptoms to his ring finger.   No Known Allergies     Social History     Socioeconomic History   • Marital status:    Tobacco Use   • Smoking status: Never   • Smokeless tobacco: Never   Vaping Use   • Vaping Use: Never used   Substance and Sexual Activity   • Alcohol use: Not Currently   • Drug use: Defer   • Sexual activity: Defer        I reviewed the patient's chief complaint, history of present illness, review of systems, past medical history, surgical history, family history, social history, medications, and allergy list.     REVIEW OF SYSTEMS    Constitutional: Denies fevers, chills, weight loss  Cardiovascular: Denies chest pain, shortness of breath  Skin: Denies rashes, acute skin changes  Neurologic: Denies headache, loss of consciousness  MSK: Left hand pain      Objective   Vital Signs:   Pulse 66   Ht 172.7 cm (68\")   Wt 103 kg (227 lb)   SpO2 98%   BMI 34.52 kg/m²     Body mass index is 34.52 kg/m².    Physical Exam    General: Alert. No acute distress.   Left hand- tender to left 4th A-1 pulley. Non-tender to 3rd. palpable nodule. No locking. Neurovascularly intact. Sensation to light touch median, radial, ulnar nerve. Positive AIN, PIN, ulnar nerve motor function. Positive pulses.     Small Joint Arthrocentesis  Consent given by: patient  Site marked: site marked  Timeout: Immediately prior to procedure a time out was called to verify the correct patient, procedure, equipment, support staff and site/side marked as required   Procedure Details  Location: ring finger (LEFT) - "   Preparation: Patient was prepped and draped in the usual sterile fashion  Needle gauge: 23 G.  Medications administered: 40 mg triamcinolone acetonide 40 MG/ML; 1 mL lidocaine 1 %  Patient tolerance: patient tolerated the procedure well with no immediate complications          Imaging Results (Most Recent)     None                   Assessment and Plan        No results found.     Diagnoses and all orders for this visit:    1. Trigger ring finger of left hand (Primary)        Discussed the treatment plan with the patient.  Discussed the risks and benefits of a trigger finger ring finger injection. The patient expressed understanding and wished to proceed. He tolerated the injection well.       Educated on risk of smoking. Discussed options for smoking cessation.   Call or return if worsening symptoms.    Scribed for Dennis Castorena MD by Amita Kenney  11/09/2022   08:22 EST         Follow Up       PRN    Patient was given instructions and counseling regarding his condition or for health maintenance advice. Please see specific information pulled into the AVS if appropriate.       I have personally performed the services described in this document as scribed by the above individual and it is both accurate and complete.     Dennis Castorena MD  11/09/22  08:36 EST

## 2022-11-10 RX ORDER — LIDOCAINE HYDROCHLORIDE 10 MG/ML
1 INJECTION, SOLUTION INFILTRATION; PERINEURAL
Status: COMPLETED | OUTPATIENT
Start: 2022-11-09 | End: 2022-11-09

## 2022-11-10 RX ORDER — TRIAMCINOLONE ACETONIDE 40 MG/ML
40 INJECTION, SUSPENSION INTRA-ARTICULAR; INTRAMUSCULAR
Status: COMPLETED | OUTPATIENT
Start: 2022-11-09 | End: 2022-11-09

## 2022-11-30 ENCOUNTER — TELEPHONE (OUTPATIENT)
Dept: INTERNAL MEDICINE | Facility: CLINIC | Age: 67
End: 2022-11-30

## 2022-12-01 RX ORDER — LISINOPRIL 20 MG/1
20 TABLET ORAL DAILY
Qty: 90 TABLET | Refills: 3 | Status: SHIPPED | OUTPATIENT
Start: 2022-12-01

## 2022-12-01 RX ORDER — EZETIMIBE 10 MG/1
10 TABLET ORAL DAILY
Qty: 90 TABLET | Refills: 3 | Status: SHIPPED | OUTPATIENT
Start: 2022-12-01

## 2023-04-19 ENCOUNTER — TELEPHONE (OUTPATIENT)
Dept: INTERNAL MEDICINE | Facility: CLINIC | Age: 68
End: 2023-04-19
Payer: MEDICARE

## 2023-04-19 RX ORDER — EZETIMIBE 10 MG/1
10 TABLET ORAL DAILY
Qty: 90 TABLET | Refills: 3 | Status: CANCELLED | OUTPATIENT
Start: 2023-04-19

## 2023-04-19 RX ORDER — LISINOPRIL 20 MG/1
20 TABLET ORAL DAILY
Qty: 90 TABLET | Refills: 3 | Status: CANCELLED | OUTPATIENT
Start: 2023-04-19

## 2023-04-19 NOTE — TELEPHONE ENCOUNTER
Caller: NYDIA HURT    Relationship: SELF    Best call back number: [phone]    Requested Prescriptions:   Requested Prescriptions     Pending Prescriptions Disp Refills   • lisinopril (PRINIVIL,ZESTRIL) 20 MG tablet 90 tablet 3     Sig: Take 1 tablet by mouth Daily.   • ezetimibe (ZETIA) 10 MG tablet 90 tablet 3     Sig: Take 1 tablet by mouth Daily.        Pharmacy where request should be sent:  [facility]    Last office visit with prescribing clinician: 2/22/2022   Last telemedicine visit with prescribing clinician: Visit date not found   Next office visit with prescribing clinician: Visit date not found     Does the patient have less than a 3 day supply:  [x] Yes  [] No    Would you like a call back once the refill request has been completed: [] Yes [x] No    If the office needs to give you a call back, can they leave a voicemail: [] Yes [x] No    Skyla Reveles, PCT   04/19/23 15:53 EDT

## 2023-04-20 RX ORDER — LISINOPRIL 20 MG/1
20 TABLET ORAL DAILY
Qty: 30 TABLET | Refills: 0 | Status: SHIPPED | OUTPATIENT
Start: 2023-04-20

## 2023-04-20 RX ORDER — EZETIMIBE 10 MG/1
10 TABLET ORAL DAILY
Qty: 30 TABLET | Refills: 0 | Status: SHIPPED | OUTPATIENT
Start: 2023-04-20

## 2023-04-21 ENCOUNTER — TELEPHONE (OUTPATIENT)
Dept: INTERNAL MEDICINE | Facility: CLINIC | Age: 68
End: 2023-04-21
Payer: MEDICARE

## 2023-04-25 RX ORDER — EZETIMIBE 10 MG/1
10 TABLET ORAL DAILY
Qty: 30 TABLET | Refills: 0 | Status: SHIPPED | OUTPATIENT
Start: 2023-04-25 | End: 2023-04-26 | Stop reason: SDUPTHER

## 2023-04-25 NOTE — TELEPHONE ENCOUNTER
Caller: rajiv hernandez    Relationship: Emergency Contact    Best call back number: [phone]     Requested Prescriptions:   Requested Prescriptions     Pending Prescriptions Disp Refills   • ezetimibe (ZETIA) 10 MG tablet 30 tablet 0     Sig: Take 1 tablet by mouth Daily.        Pharmacy where request should be sent:      [facility]     [address] [place] [address]    [phone]        Last office visit with prescribing clinician: 2/22/2022   Last telemedicine visit with prescribing clinician: Visit date not found   Next office visit with prescribing clinician: Visit date not found     Additional details provided by patient: OUT OF MEDICATION     Does the patient have less than a 3 day supply:  [x] Yes  [] No    Would you like a call back once the refill request has been completed: [x] Yes [] No    If the office needs to give you a call back, can they leave a voicemail: [x] Yes [] No    Lola Tavares Rep   04/25/23 12:18 EDT

## 2023-04-26 RX ORDER — EZETIMIBE 10 MG/1
10 TABLET ORAL DAILY
Qty: 30 TABLET | Refills: 0 | Status: SHIPPED | OUTPATIENT
Start: 2023-04-26

## 2023-04-26 NOTE — TELEPHONE ENCOUNTER
PATIENT WIFE STATES THEY CAN NO LONGER USE [facility] AND HAVE TO USE [facility] FOR FUTURE REFILLS. EZEQUIEL WAS SENT TO [facility] YESTERDAY, RESENDING TODAY TO [facility]

## 2023-09-19 NOTE — TELEPHONE ENCOUNTER
Caller: Durga Butts    Relationship: Self    Best call back number: [phone]     Requested Prescriptions:   Requested Prescriptions     Pending Prescriptions Disp Refills    lisinopril (PRINIVIL,ZESTRIL) 20 MG tablet 30 tablet 0     Sig: Take 1 tablet by mouth Daily.    ezetimibe (ZETIA) 10 MG tablet 30 tablet 0     Sig: Take 1 tablet by mouth Daily.        Pharmacy where request should be sent: [place] [phone] [place] [phone]      Last office visit with prescribing clinician: 2/22/2022   Last telemedicine visit with prescribing clinician: Visit date not found   Next office visit with prescribing clinician: 10/4/2023     Additional details provided by patient:     Does the patient have less than a 3 day supply:  [] Yes  [x] No    Would you like a call back once the refill request has been completed: [] Yes [] No    If the office needs to give you a call back, can they leave a voicemail: [] Yes [] No    Lola Mathews   09/19/23 09:30 EDT

## 2023-09-25 RX ORDER — LISINOPRIL 20 MG/1
20 TABLET ORAL DAILY
Qty: 30 TABLET | Refills: 0 | Status: SHIPPED | OUTPATIENT
Start: 2023-09-25

## 2023-09-25 RX ORDER — EZETIMIBE 10 MG/1
10 TABLET ORAL DAILY
Qty: 30 TABLET | Refills: 0 | Status: SHIPPED | OUTPATIENT
Start: 2023-09-25

## 2023-09-25 NOTE — TELEPHONE ENCOUNTER
lisinopril (PRINIVIL,ZESTRIL) 20 MG tablet         Sig: Take 1 tablet by mouth Daily.    Disp: 30 tablet    Refills: 0    Start: 9/19/2023    Class: Normal    Last ordered: 5 months ago by WARREN Leary     ACE Inhibitors Protocol [id number]/19/2023 09:31 AM   Protocol Details Normal serum potassium in past 12 months    Most recent eGFR is above 30 in the past 12 months.    Blood pressure on record    Patient is not on Entresto    Patient is not on an ARB    Recent or future visit with authorizing provider       ezetimibe (ZETIA) 10 MG tablet         Sig: Take 1 tablet by mouth Daily.    Disp: 30 tablet    Refills: 0    Start: 9/19/2023    Class: Normal    Last ordered: 5 months ago by WARREN Leary     Intestinal Cholesterol Absorption Inhibitor Protocol [id number]/19/2023 09:31 AM   Protocol Details Lipid panel in past 12 months    AST or ALT in past 12 months    No active pregnancy on file    Recent or future visit with prescriber (12MO/30D)

## 2023-10-04 ENCOUNTER — OFFICE VISIT (OUTPATIENT)
Dept: INTERNAL MEDICINE | Facility: CLINIC | Age: 68
End: 2023-10-04
Payer: MEDICARE

## 2023-10-04 VITALS
BODY MASS INDEX: 34.71 KG/M2 | SYSTOLIC BLOOD PRESSURE: 128 MMHG | TEMPERATURE: 96.9 F | OXYGEN SATURATION: 97 % | WEIGHT: 229 LBS | HEART RATE: 86 BPM | HEIGHT: 68 IN | RESPIRATION RATE: 18 BRPM | DIASTOLIC BLOOD PRESSURE: 78 MMHG

## 2023-10-04 DIAGNOSIS — Z12.5 SCREENING PSA (PROSTATE SPECIFIC ANTIGEN): ICD-10-CM

## 2023-10-04 DIAGNOSIS — E55.9 VITAMIN D DEFICIENCY: ICD-10-CM

## 2023-10-04 DIAGNOSIS — L98.9 SKIN LESION: ICD-10-CM

## 2023-10-04 DIAGNOSIS — R05.2 SUBACUTE COUGH: Primary | ICD-10-CM

## 2023-10-04 DIAGNOSIS — R53.83 OTHER FATIGUE: ICD-10-CM

## 2023-10-04 DIAGNOSIS — R53.83 FATIGUE, UNSPECIFIED TYPE: ICD-10-CM

## 2023-10-04 DIAGNOSIS — R33.9 URINARY RETENTION: ICD-10-CM

## 2023-10-04 DIAGNOSIS — Z00.00 ENCOUNTER FOR ANNUAL WELLNESS EXAM IN MEDICARE PATIENT: ICD-10-CM

## 2023-10-04 LAB
25(OH)D3 SERPL-MCNC: 27.1 NG/ML (ref 30–100)
ALBUMIN SERPL-MCNC: 4.3 G/DL (ref 3.5–5.2)
ALBUMIN/GLOB SERPL: 1.8 G/DL
ALP SERPL-CCNC: 95 U/L (ref 39–117)
ALT SERPL W P-5'-P-CCNC: 23 U/L (ref 1–41)
ANION GAP SERPL CALCULATED.3IONS-SCNC: 12 MMOL/L (ref 5–15)
AST SERPL-CCNC: 16 U/L (ref 1–40)
BASOPHILS # BLD AUTO: 0.05 10*3/MM3 (ref 0–0.2)
BASOPHILS NFR BLD AUTO: 0.6 % (ref 0–1.5)
BILIRUB SERPL-MCNC: 0.2 MG/DL (ref 0–1.2)
BUN SERPL-MCNC: 15 MG/DL (ref 8–23)
BUN/CREAT SERPL: 16.1 (ref 7–25)
CALCIUM SPEC-SCNC: 9.3 MG/DL (ref 8.6–10.5)
CHLORIDE SERPL-SCNC: 108 MMOL/L (ref 98–107)
CHOLEST SERPL-MCNC: 194 MG/DL (ref 0–200)
CO2 SERPL-SCNC: 23 MMOL/L (ref 22–29)
CREAT SERPL-MCNC: 0.93 MG/DL (ref 0.76–1.27)
DEPRECATED RDW RBC AUTO: 40.6 FL (ref 37–54)
EGFRCR SERPLBLD CKD-EPI 2021: 88.9 ML/MIN/1.73
EOSINOPHIL # BLD AUTO: 0.29 10*3/MM3 (ref 0–0.4)
EOSINOPHIL NFR BLD AUTO: 3.2 % (ref 0.3–6.2)
ERYTHROCYTE [DISTWIDTH] IN BLOOD BY AUTOMATED COUNT: 13 % (ref 12.3–15.4)
GLOBULIN UR ELPH-MCNC: 2.4 GM/DL
GLUCOSE SERPL-MCNC: 86 MG/DL (ref 65–99)
HCT VFR BLD AUTO: 46.1 % (ref 37.5–51)
HDLC SERPL-MCNC: 43 MG/DL (ref 40–60)
HGB BLD-MCNC: 15.6 G/DL (ref 13–17.7)
IMM GRANULOCYTES # BLD AUTO: 0.02 10*3/MM3 (ref 0–0.05)
IMM GRANULOCYTES NFR BLD AUTO: 0.2 % (ref 0–0.5)
LDLC SERPL CALC-MCNC: 123 MG/DL (ref 0–100)
LDLC/HDLC SERPL: 2.79 {RATIO}
LYMPHOCYTES # BLD AUTO: 2.1 10*3/MM3 (ref 0.7–3.1)
LYMPHOCYTES NFR BLD AUTO: 23.2 % (ref 19.6–45.3)
MCH RBC QN AUTO: 29.3 PG (ref 26.6–33)
MCHC RBC AUTO-ENTMCNC: 33.8 G/DL (ref 31.5–35.7)
MCV RBC AUTO: 86.7 FL (ref 79–97)
MONOCYTES # BLD AUTO: 0.61 10*3/MM3 (ref 0.1–0.9)
MONOCYTES NFR BLD AUTO: 6.7 % (ref 5–12)
NEUTROPHILS NFR BLD AUTO: 5.97 10*3/MM3 (ref 1.7–7)
NEUTROPHILS NFR BLD AUTO: 66.1 % (ref 42.7–76)
NRBC BLD AUTO-RTO: 0 /100 WBC (ref 0–0.2)
PLATELET # BLD AUTO: 177 10*3/MM3 (ref 140–450)
PMV BLD AUTO: 11 FL (ref 6–12)
POTASSIUM SERPL-SCNC: 4.1 MMOL/L (ref 3.5–5.2)
PROT SERPL-MCNC: 6.7 G/DL (ref 6–8.5)
PSA SERPL-MCNC: 1.88 NG/ML (ref 0–4)
RBC # BLD AUTO: 5.32 10*6/MM3 (ref 4.14–5.8)
SODIUM SERPL-SCNC: 143 MMOL/L (ref 136–145)
TRIGL SERPL-MCNC: 155 MG/DL (ref 0–150)
TSH SERPL DL<=0.05 MIU/L-ACNC: 4.1 UIU/ML (ref 0.27–4.2)
VIT B12 BLD-MCNC: 799 PG/ML (ref 211–946)
VLDLC SERPL-MCNC: 28 MG/DL (ref 5–40)
WBC NRBC COR # BLD: 9.04 10*3/MM3 (ref 3.4–10.8)

## 2023-10-04 PROCEDURE — 82607 VITAMIN B-12: CPT | Performed by: NURSE PRACTITIONER

## 2023-10-04 PROCEDURE — 80053 COMPREHEN METABOLIC PANEL: CPT | Performed by: NURSE PRACTITIONER

## 2023-10-04 PROCEDURE — 84443 ASSAY THYROID STIM HORMONE: CPT | Performed by: NURSE PRACTITIONER

## 2023-10-04 PROCEDURE — G0103 PSA SCREENING: HCPCS | Performed by: NURSE PRACTITIONER

## 2023-10-04 PROCEDURE — 84403 ASSAY OF TOTAL TESTOSTERONE: CPT | Performed by: NURSE PRACTITIONER

## 2023-10-04 PROCEDURE — 85025 COMPLETE CBC W/AUTO DIFF WBC: CPT | Performed by: NURSE PRACTITIONER

## 2023-10-04 PROCEDURE — 80061 LIPID PANEL: CPT | Performed by: NURSE PRACTITIONER

## 2023-10-04 PROCEDURE — 82306 VITAMIN D 25 HYDROXY: CPT | Performed by: NURSE PRACTITIONER

## 2023-10-04 PROCEDURE — 84402 ASSAY OF FREE TESTOSTERONE: CPT | Performed by: NURSE PRACTITIONER

## 2023-10-04 NOTE — PROGRESS NOTES
The ABCs of the Annual Wellness Visit  Subsequent Medicare Wellness Visit    Subjective    Durga Butts is a 68 y.o. male who presents for a Subsequent Medicare Wellness Visit.    The following portions of the patient's history were reviewed and   updated as appropriate: allergies, current medications, past family history, past medical history, past social history, past surgical history, and problem list.    Compared to one year ago, the patient feels his physical   health is the same.    Compared to one year ago, the patient feels his mental   health is the same.    Recent Hospitalizations:  He was not admitted to the hospital during the last year.       Current Medical Providers:  Patient Care Team:  Ramandeep Gray APRN as PCP - General (Nurse Practitioner)    Outpatient Medications Prior to Visit   Medication Sig Dispense Refill    ezetimibe (ZETIA) 10 MG tablet Take 1 tablet by mouth Daily. 30 tablet 0    lisinopril (PRINIVIL,ZESTRIL) 20 MG tablet Take 1 tablet by mouth Daily. 30 tablet 0    Misc Natural Products (NF FORMULAS TESTOSTERONE PO) Take 1 capsule by mouth Daily.      multivitamin with minerals tablet tablet Take 1 tablet by mouth Daily.       No facility-administered medications prior to visit.       No opioid medication identified on active medication list. I have reviewed chart for other potential  high risk medication/s and harmful drug interactions in the elderly.        Aspirin is not on active medication list.  Aspirin use is not indicated based on review of current medical condition/s. Risk of harm outweighs potential benefits.  .    Patient Active Problem List   Diagnosis    Hyperlipidemia    Hypertension    Memory loss    Other fatigue    Trigger middle finger of left hand    Chest pain    Acute URI    Gastroenteritis    Laceration of left lower extremity    Tick bite of abdominal wall    Great toe pain, left     Advance Care Planning   Advance Care Planning     Advance Directive is not  "on file.  ACP discussion was held with the patient during this visit. Patient does not have an advance directive, declines further assistance.     Objective    Vitals:    10/04/23 0957   BP: 128/78   BP Location: Left arm   Patient Position: Sitting   Cuff Size: Adult   Pulse: 86   Resp: 18   Temp: 96.9 °F (36.1 °C)   SpO2: 97%   Weight: 104 kg (229 lb)   Height: 172.7 cm (68\")     Estimated body mass index is 34.82 kg/m² as calculated from the following:    Height as of this encounter: 172.7 cm (68\").    Weight as of this encounter: 104 kg (229 lb).    BMI is >= 30 and <35. (Class 1 Obesity). The following options were offered after discussion;: exercise counseling/recommendations and nutrition counseling/recommendations      Does the patient have evidence of cognitive impairment? No          HEALTH RISK ASSESSMENT    Smoking Status:  Social History     Tobacco Use   Smoking Status Former    Packs/day: 0.50    Years: 30.00    Pack years: 15.00    Types: Cigarettes   Smokeless Tobacco Never   Tobacco Comments    Started smoking when [age]     Alcohol Consumption:  Social History     Substance and Sexual Activity   Alcohol Use Not Currently     Fall Risk Screen:    STEADI Fall Risk Assessment was completed, and patient is at LOW risk for falls.Assessment completed on:10/4/2023    Depression Screening:      10/4/2023    10:09 AM   PHQ-2/PHQ-9 Depression Screening   Little Interest or Pleasure in Doing Things 0-->not at all   Feeling Down, Depressed or Hopeless 0-->not at all   PHQ-9: Brief Depression Severity Measure Score 0       Health Habits and Functional and Cognitive Screening:      10/4/2023    10:10 AM   Functional & Cognitive Status   Do you have difficulty preparing food and eating? No   Do you have difficulty bathing yourself, getting dressed or grooming yourself? No   Do you have difficulty using the toilet? No   Do you have difficulty moving around from place to place? No   Do you have trouble with steps " or getting out of a bed or a chair? No   Current Diet Well Balanced Diet   Dental Exam Up to date   Eye Exam Up to date   Exercise (times per week) 4 times per week   Current Exercises Include Walking   Do you need help using the phone?  No   Are you deaf or do you have serious difficulty hearing?  No   Do you need help to go to places out of walking distance? No   Do you need help shopping? No   Do you need help preparing meals?  No   Do you need help with housework?  No   Do you need help with laundry? No   Do you need help taking your medications? No   Do you need help managing money? No   Do you ever drive or ride in a car without wearing a seat belt? No   Have you felt unusual stress, anger or loneliness in the last month? No   Who do you live with? Spouse   If you need help, do you have trouble finding someone available to you? No   Have you been bothered in the last four weeks by sexual problems? No   Do you have difficulty concentrating, remembering or making decisions? Yes       Age-appropriate Screening Schedule:  Refer to the list below for future screening recommendations based on patient's age, sex and/or medical conditions. Orders for these recommended tests are listed in the plan section. The patient has been provided with a written plan.    Health Maintenance   Topic Date Due    BMI FOLLOWUP  Never done    COLORECTAL CANCER SCREENING  Never done    ZOSTER VACCINE (1 of 2) Never done    Pneumococcal Vaccine 65+ (2 - PPSV23 or PCV20) 11/14/2020    HEPATITIS C SCREENING  Never done    LIPID PANEL  09/28/2021    INFLUENZA VACCINE  08/01/2023    COVID-19 Vaccine (4 - 2023-24 season) 09/01/2023    AAA SCREEN (ONE-TIME)  Never done    ANNUAL WELLNESS VISIT  10/04/2024    TDAP/TD VACCINES (2 - Td or Tdap) 03/14/2032                  CMS Preventative Services Quick Reference  Risk Factors Identified During Encounter  Immunizations Discussed/Encouraged: Influenza and Shingrix  The above risks/problems have  been discussed with the patient.  Pertinent information has been shared with the patient in the After Visit Summary.  An After Visit Summary and PPPS were made available to the patient.    Follow Up:   Next Medicare Wellness visit to be scheduled in 1 year.       Additional E&M Note during same encounter follows:  Patient has multiple medical problems which are significant and separately identifiable that require additional work above and beyond the Medicare Wellness Visit.      Chief Complaint  Medicare Wellness-subsequent (Needs PSA done), Chest Pain (Upper right chest after working in the yard or garage and stairs- Saturday/ Sunday- NO CHEST PAIN currently ), Insomnia (Ongoing uses sleeping elisabeth), Shortness of Breath (With cough from time to time- 2/3 months), Shoulder Pain (Left- when sleeping on that side- taking Ibuprofen for pain- 2/3 months), and Ear Drainage (Right ear- when sleeping )    Subjective        HPI  Durga Butts is also being seen today for     He reports 2 episodes of chest pain, once on right chest and one central. Reports occurring when resting. Resolved within a few mins without intervention. Denies soa, diaphoresis during episodes  Denies chest pain today  Reports pain to be a stabbing  Stress last year    Reports ongoing cough x3 mths  He reports having URI sx about 4 mths ago and felt well after a week.  Reports occasional soa in the last 3 mths with coughing episodes.  Dry cough  Denies PND  Some sinus congestion    Reports feeling like he is sleeping okay although elisabeth tells him he toss and turns  Denies snoring  Feels rested when he wakes  Had had sleep apnea     HTN-well controlled  headaches    Skin lesions of scalp, light colored, present for several months.    He reports more issues with prostate. He is waking more frequently at night. He reports not fully emptying bladder. Reports poor stream  Tried otc options for procedure  Also taking otc testosterone supplements  Objective  "  Vital Signs:  /78 (BP Location: Left arm, Patient Position: Sitting, Cuff Size: Adult)   Pulse 86   Temp 96.9 °F (36.1 °C)   Resp 18   Ht 172.7 cm (68\")   Wt 104 kg (229 lb)   SpO2 97%   BMI 34.82 kg/m²     Physical Exam  Vitals and nursing note reviewed.   Constitutional:       General: He is not in acute distress.     Appearance: Normal appearance.   HENT:      Head: Normocephalic and atraumatic.      Right Ear: External ear normal.      Left Ear: External ear normal.      Nose: Nose normal.      Mouth/Throat:      Mouth: Mucous membranes are moist.   Eyes:      Conjunctiva/sclera: Conjunctivae normal.   Cardiovascular:      Rate and Rhythm: Normal rate and regular rhythm.      Pulses: Normal pulses.      Heart sounds: Normal heart sounds. No murmur heard.    No friction rub. No gallop.   Pulmonary:      Effort: Pulmonary effort is normal. No respiratory distress.      Breath sounds: No wheezing, rhonchi or rales.   Musculoskeletal:      Cervical back: Neck supple.      Right lower leg: No edema.      Left lower leg: No edema.   Skin:     General: Skin is warm and dry.   Neurological:      General: No focal deficit present.      Mental Status: He is alert and oriented to person, place, and time.   Psychiatric:         Mood and Affect: Mood normal.         Behavior: Behavior normal.                       Assessment and Plan   Diagnoses and all orders for this visit:    1. Subacute cough (Primary)  Comments:  Reassuring exam today.  We will get chest x-ray  Orders:  -     XR Chest PA & Lateral (In Office); Future    2. Skin lesion  Comments:  Sending to dermatology for possible biopsy  Orders:  -     Ambulatory Referral to Dermatology    3. Encounter for annual wellness exam in Medicare patient  -     Cancel: Comprehensive Metabolic Panel  -     Cancel: CBC & Differential  -     Cancel: TSH  -     Cancel: Lipid Panel    4. Fatigue, unspecified type  Comments:  Checking labs today.  Orders:  -     " Cancel: CBC & Differential  -     Cancel: TSH  -     Vitamin D,25-Hydroxy  -     Vitamin B12  -     Cancel: Testosterone    5. Vitamin D deficiency  -     Vitamin D,25-Hydroxy    6. Screening PSA (prostate specific antigen)  Comments:  We will check PSA today.  Orders:  -     PSA Screen    7. Urinary retention  Comments:  PSA today.  Sending urology for further work-up.  Orders:  -     Ambulatory Referral to Urology    8. Other fatigue  -     Cancel: Testosterone, Free, Total             Follow Up   Return in about 2 months (around 12/4/2023).  Patient was given instructions and counseling regarding his condition or for health maintenance advice. Please see specific information pulled into the AVS if appropriate.

## 2023-10-11 LAB
TESTOST FREE SERPL-MCNC: 3.4 PG/ML (ref 6.6–18.1)
TESTOST SERPL-MCNC: 140 NG/DL (ref 264–916)

## 2023-10-12 ENCOUNTER — TELEPHONE (OUTPATIENT)
Dept: INTERNAL MEDICINE | Facility: CLINIC | Age: 68
End: 2023-10-12
Payer: MEDICARE

## 2023-10-12 RX ORDER — EZETIMIBE 10 MG/1
10 TABLET ORAL DAILY
Qty: 30 TABLET | Refills: 0 | Status: SHIPPED | OUTPATIENT
Start: 2023-10-12

## 2023-10-12 RX ORDER — LISINOPRIL 20 MG/1
20 TABLET ORAL DAILY
Qty: 30 TABLET | Refills: 0 | Status: SHIPPED | OUTPATIENT
Start: 2023-10-12

## 2023-10-12 NOTE — TELEPHONE ENCOUNTER
Patient is wondering about Urology referral it was placed by Ramandeep on 10/4/2023, states he has not had a call back to schedule an appointment. Is someone able to look into this so I can give him a call back with some more information?

## 2023-10-17 ENCOUNTER — TELEPHONE (OUTPATIENT)
Dept: INTERNAL MEDICINE | Facility: CLINIC | Age: 68
End: 2023-10-17
Payer: MEDICARE

## 2023-10-17 NOTE — TELEPHONE ENCOUNTER
Caller: Durga Butts    Relationship: Self    Best call back number: [phone]     Requested Prescriptions:   VITAMIN D       Pharmacy where request should be sent:  [facility] - [address] [phone] [facility] [phone]      Last office visit with prescribing clinician: 10/4/2023   Last telemedicine visit with prescribing clinician: Visit date not found   Next office visit with prescribing clinician: 12/4/2023     Additional details provided by patient: PATIENT STATED THAT THE PHARMACY INFORMED THAT THE VITAMIN D IS ONLY AVAILABLE IN TABLETS NOT CAPSULES AND HE IS REQUESTING THIS CHANGE BE MADE SO THAT IT CAN BE FILLED.     Lola Ritchie Rep   10/17/23 12:54 EDT

## 2023-10-19 RX ORDER — MELATONIN
1000 DAILY
Qty: 90 TABLET | Refills: 1 | Status: SHIPPED | OUTPATIENT
Start: 2023-10-19

## 2023-11-01 ENCOUNTER — TELEPHONE (OUTPATIENT)
Dept: INTERNAL MEDICINE | Facility: CLINIC | Age: 68
End: 2023-11-01
Payer: MEDICARE

## 2023-11-01 NOTE — TELEPHONE ENCOUNTER
Patients wife rajiv called office on update for referral for urology. Pt was seen on 10/04/23 orders were placed to urology for further work-up at that office visit. Pt hasn't heard from them to get an appointment scheduled yet. Advised pt to call and check on status and get the appointment scheduled. Pt should call our office back for any additional issues.

## 2023-12-04 ENCOUNTER — OFFICE VISIT (OUTPATIENT)
Dept: INTERNAL MEDICINE | Facility: CLINIC | Age: 68
End: 2023-12-04
Payer: MEDICARE

## 2023-12-04 VITALS
SYSTOLIC BLOOD PRESSURE: 124 MMHG | TEMPERATURE: 97.6 F | BODY MASS INDEX: 34.95 KG/M2 | RESPIRATION RATE: 16 BRPM | DIASTOLIC BLOOD PRESSURE: 86 MMHG | HEIGHT: 68 IN | WEIGHT: 230.6 LBS | HEART RATE: 87 BPM | OXYGEN SATURATION: 97 %

## 2023-12-04 DIAGNOSIS — M25.512 ACUTE PAIN OF LEFT SHOULDER: Primary | ICD-10-CM

## 2023-12-04 DIAGNOSIS — E34.9 HYPOTESTOSTERONEMIA: ICD-10-CM

## 2023-12-04 DIAGNOSIS — R33.9 URINARY RETENTION: ICD-10-CM

## 2023-12-04 DIAGNOSIS — I10 PRIMARY HYPERTENSION: ICD-10-CM

## 2023-12-04 DIAGNOSIS — R53.83 FATIGUE, UNSPECIFIED TYPE: ICD-10-CM

## 2023-12-04 LAB — TESTOST SERPL-MCNC: 122 NG/DL (ref 193–740)

## 2023-12-04 PROCEDURE — 84403 ASSAY OF TOTAL TESTOSTERONE: CPT | Performed by: NURSE PRACTITIONER

## 2023-12-04 RX ORDER — MULTIVIT-MIN/IRON/FOLIC ACID/K 18-600-40
1 CAPSULE ORAL DAILY
Qty: 90 CAPSULE | Refills: 3 | Status: SHIPPED | OUTPATIENT
Start: 2023-12-04

## 2023-12-04 NOTE — PROGRESS NOTES
"Chief Complaint  Follow-up (2 month follow up on dermatology and urology ) and Shoulder Pain (Left shoulder pain )    Subjective          Durga Butts presents to [facility]  History of Present Illness  Left shoulder pain, worse in the last month  He denies injury  Pain with raising arm  He reports trying motrin, salon pas, biofreeze  He reports left hand cramping at times but improves with motrin  Keeping him awake at night    Dermatology-noncancerous lesions, forehead and scalp    Urinary retention-has not had a call for urology  Denies change in sx    He reports being back in the office 24/7 for work    Labs reviewedt  Testosterone slightly low    HTN-well controlled  Denies chest pain, headache other that will skin lesion cryotherapy per derm  Objective   Vital Signs:   /86 (BP Location: Right arm, Patient Position: Sitting, Cuff Size: Adult)   Pulse 87   Temp 97.6 °F (36.4 °C) (Temporal)   Resp 16   Ht 172.7 cm (67.99\")   Wt 105 kg (230 lb 9.6 oz)   SpO2 97%   BMI 35.07 kg/m²     Physical Exam  Vitals and nursing note reviewed.   Constitutional:       General: He is not in acute distress.     Appearance: Normal appearance.   HENT:      Head: Normocephalic and atraumatic.      Right Ear: External ear normal.      Left Ear: External ear normal.      Nose: Nose normal.      Mouth/Throat:      Mouth: Mucous membranes are moist.   Eyes:      Conjunctiva/sclera: Conjunctivae normal.   Cardiovascular:      Rate and Rhythm: Normal rate and regular rhythm.      Pulses: Normal pulses.      Heart sounds: Normal heart sounds. No murmur heard.     No friction rub. No gallop.   Pulmonary:      Effort: Pulmonary effort is normal. No respiratory distress.      Breath sounds: No wheezing, rhonchi or rales.   Musculoskeletal:      Left shoulder: Tenderness (AC) present. No crepitus. Normal range of motion.      Cervical back: Neck supple.      Right lower leg: No " edema.      Left lower leg: No edema.      Comments: +neer's   Skin:     General: Skin is warm and dry.   Neurological:      General: No focal deficit present.      Mental Status: He is alert and oriented to person, place, and time.   Psychiatric:         Mood and Affect: Mood normal.         Behavior: Behavior normal.        Result Review :          Procedures      Assessment and Plan    Diagnoses and all orders for this visit:    1. Acute pain of left shoulder (Primary)  Comments:  likely nerve impingement. outpatient PT offered but prefers to do exercises at home first. will call with worsening or failure to improve.    2. Urinary retention  Comments:  checking on urology referral.    3. Fatigue, unspecified type  Comments:  rechecking testosterone today  Orders:  -     Testosterone    4. Primary hypertension  Comments:  well controlled    Other orders  -     Vitamin D, Cholecalciferol, 50 MCG (2000 UT) capsule; Take 1 capsule by mouth Daily.  Dispense: 90 capsule; Refill: 3              Follow Up   No follow-ups on file.  Patient was given instructions and counseling regarding his condition or for health maintenance advice. Please see specific information pulled into the AVS if appropriate.

## 2023-12-21 RX ORDER — LISINOPRIL 20 MG/1
20 TABLET ORAL DAILY
Qty: 30 TABLET | Refills: 0 | Status: SHIPPED | OUTPATIENT
Start: 2023-12-21

## 2023-12-21 RX ORDER — EZETIMIBE 10 MG/1
10 TABLET ORAL DAILY
Qty: 30 TABLET | Refills: 0 | Status: SHIPPED | OUTPATIENT
Start: 2023-12-21

## 2023-12-21 NOTE — TELEPHONE ENCOUNTER
Caller: Durga Butts    Relationship: Self    Best call back number: [phone]     Requested Prescriptions:   Requested Prescriptions     Pending Prescriptions Disp Refills    ezetimibe (ZETIA) 10 MG tablet 30 tablet 0     Sig: Take 1 tablet by mouth Daily.    lisinopril (PRINIVIL,ZESTRIL) 20 MG tablet 30 tablet 0     Sig: Take 1 tablet by mouth Daily.        Pharmacy where request should be sent: [place] [phone] [place] [phone]      Last office visit with prescribing clinician: 12/4/2023   Last telemedicine visit with prescribing clinician: Visit date not found   Next office visit with prescribing clinician: Visit date not found     Does the patient have less than a 3 day supply:  [x] Yes  [] No    Would you like a call back once the refill request has been completed: [] Yes [] No    If the office needs to give you a call back, can they leave a voicemail: [] Yes [] No    Lola Winters Rep   12/21/23 10:46 EST

## 2023-12-22 NOTE — PROGRESS NOTES
Chief Complaint: Urinary Retention    Subjective         History of Present Illness  Durga Butts is a 68 y.o. male presents to [facility] UR[facility] to be seen for urinary retention.    Patient presents reporting he has had several month hx of having to void x 2 to empty his bladder. Tried OTC prostate meds without relief of symptoms.     He was seen yesterday by endocrinologist yesterday who is going to prescribe testosterone patches.     Frequency-admits    Urgency-denies    Incontinence-post void dribble    Alomhint-2-1    Perineal pain-denies    Dysuria-denies    Stream-weak and sprays    GH-denies    History of stones-denies     surgeries-denies    Family history of  malignancy-denies    Cardiopulmonary-HTN    Anticoagulants-denies    Smoker-denies    Objective     Past Medical History:   Diagnosis Date    Hyperlipidemia     Hypertension        History reviewed. No pertinent surgical history.      Current Outpatient Medications:     ezetimibe (ZETIA) 10 MG tablet, Take 1 tablet by mouth Daily., Disp: 30 tablet, Rfl: 0    lisinopril (PRINIVIL,ZESTRIL) 20 MG tablet, Take 1 tablet by mouth Daily., Disp: 30 tablet, Rfl: 0    Misc Natural Products (NF FORMULAS TESTOSTERONE PO), Take 1 capsule by mouth Daily., Disp: , Rfl:     multivitamin with minerals tablet tablet, Take 1 tablet by mouth Daily., Disp: , Rfl:     Vitamin D, Cholecalciferol, 50 MCG (2000) capsule, Take 1 capsule by mouth Daily., Disp: 90 capsule, Rfl: 3    No Known Allergies     Family History   Problem Relation Age of Onset    Heart disease Father     Heart disease Brother        Social History     Socioeconomic History    Marital status:    Tobacco Use    Smoking status: Former     Packs/day: 0.50     Years: 30.00     Additional pack years: 0.00     Total pack years: 15.00     Types: Cigarettes     Quit date:      Years since quittin.0     Passive exposure: Past    Smokeless tobacco: Never    Tobacco  "comments:     Started smoking when [age]   Vaping Use    Vaping Use: Never used   Substance and Sexual Activity    Alcohol use: Not Currently    Drug use: Not Currently    Sexual activity: Defer       Vital Signs:   /82 (BP Location: Left arm, Patient Position: Sitting, Cuff Size: Large Adult)   Pulse 69   Ht 172.7 cm (68\")   Wt 105 kg (232 lb 3.2 oz)   BMI 35.31 kg/m²      Physical Exam  Vitals reviewed.   Constitutional:       Appearance: Normal appearance.   Neurological:      General: No focal deficit present.      Mental Status: He is alert and oriented to person, place, and time.   Psychiatric:         Mood and Affect: Mood normal.         Behavior: Behavior normal.          Result Review :   The following data was reviewed by: WARREN Gipson on 12/27/2023:  Results for orders placed or performed in visit on 12/27/23   Bladder Scan   Result Value Ref Range    Urine Volume 0    POC Urinalysis Dipstick, Automated    Specimen: Urine   Result Value Ref Range    Color Yellow Yellow, Straw, Dark Yellow, Lynsey    Clarity, UA Clear Clear    Specific Gravity  1.020 1.005 - 1.030    pH, Urine 7.0 5.0 - 8.0    Leukocytes Trace (A) Negative    Nitrite, UA Negative Negative    Protein, POC Negative Negative mg/dL    Glucose, UA Negative Negative mg/dL    Ketones, UA Negative Negative    Urobilinogen, UA Normal Normal, 0.2 E.U./dL    Bilirubin Negative Negative    Blood, UA Negative Negative    Lot Number [id number]     Expiration Date 10/2,024       PSA          10/4/2023    11:51   PSA   PSA 1.880        Bladder Scan interpretation 12/27/2023    Estimation of residual urine via BVI 3000 Verathon Bladder Scan  MA/nurse performing: Jessica SHARMA MA  Residual Urine: 0 ml  Indication: Benign prostatic hyperplasia (BPH) with post-void dribbling   Position: Supine  Examination: Incremental scanning of the suprapubic area using 2.0 MHz transducer using copious amounts of acoustic gel.   Findings: An anechoic " area was demonstrated which represented the bladder, with measurement of residual urine as noted. I inspected this myself. In that the residual urine was insignificant, refer to plan for treatment and plan necessary at this time.           Procedures        Assessment and Plan    Diagnoses and all orders for this visit:    1. Benign prostatic hyperplasia (BPH) with post-void dribbling (Primary)  -     Bladder Scan  -     POC Urinalysis Dipstick, Automated    BPH with Luts- behavioral modifications including fluid management, limiting fluids prior to sleep, and voiding immediately prior to sleep.      Continue conservative management of BPH Luts via behavioral modifications and medications; monitor for adverse outcomes of BPH which would warrant discussion of further management (ie hydronephrosis, recurrent uti, bladder stones, urinary retention, or renal insufficiency)    Discussed with patient that at this point since he had 0 on his PVR that whether or not he takes medication it is totally up to him and how bothered he is by symptoms.  He has decided at this time he does not want to take medications if he can keep from it.  I will see him back in 6 months with a repeat PVR to ensure that he is still emptying his bladder well.  We did discuss that if his symptoms worsen that he could definitely schedule a follow-up sooner and reevaluate starting medications.      Follow Up   Return in about 6 months (around 6/27/2024).  Patient was given instructions and counseling regarding his condition or for health maintenance advice. Please see specific information pulled into the AVS if appropriate.         This document has been electronically signed by WARREN Gipson  December 27, 2023 13:53 EST

## 2023-12-26 ENCOUNTER — OFFICE VISIT (OUTPATIENT)
Dept: ENDOCRINOLOGY | Age: 68
End: 2023-12-26
Payer: MEDICARE

## 2023-12-26 VITALS
DIASTOLIC BLOOD PRESSURE: 82 MMHG | WEIGHT: 233.2 LBS | HEART RATE: 60 BPM | HEIGHT: 68 IN | TEMPERATURE: 97.1 F | BODY MASS INDEX: 35.34 KG/M2 | OXYGEN SATURATION: 96 % | SYSTOLIC BLOOD PRESSURE: 118 MMHG

## 2023-12-26 DIAGNOSIS — E29.1 HYPOGONADISM IN MALE: Primary | ICD-10-CM

## 2023-12-26 NOTE — ASSESSMENT & PLAN NOTE
Never had a compr hormonal workup  Will check CBC, CMP, TSH and free T4  Will check LH and FSH and estradiol level  Will check prolactin level  Will check vitamin B12 and folate  Will obtain pituitary MRI  If the result is consistent with hypogonadism will start the testosterone supplements, patient prefers to do AndroGel and as above testosterone injection

## 2023-12-26 NOTE — PROGRESS NOTES
"Chief Complaint  Hypotestosteronemia    Subjective        Durga Butts presents to [facility] tablet care    History of Present Illness      Has been taking Trojan man boost daily since last year    Male hypogonadism:   Patient referred for further evaluation of hypogonadism.   Presenting symptoms: low energy level    Pre-treatment levels: 174    Current supplementation: No   Energy: still low    Weakness/fatigue: Yes    Weight gain: Yes , gained 8 lb since last year    Headache, change in vision: No    Polyuria/polydipsia : No   Change in diet/exercise habits/Recent illness : No    Psychological stressors.No    Libido: Normal    Spontaneous erection: Present    Able to have and maintain erections during intercourse: Yes   Breast enlargement/tenderness/galactorrhea: No   Biological children: 2 , youngest one is 34    H/o TBI/concussion: No    H/o testicular trauma/ orchitis/: No   H/o chronic gluco-corticoids: No    H/o chronic narcotics: No    H/o chemotherapy: N o    H/o LETA: No    H/o blood clots: No    H/o prostate cancer: NO       Objective   Vital Signs:  /82   Pulse 60   Temp 97.1 °F (36.2 °C) (Temporal)   Ht 172.7 cm (67.99\")   Wt 106 kg (233 lb 3.2 oz)   SpO2 96%   BMI 35.47 kg/m²   Estimated body mass index is 35.47 kg/m² as calculated from the following:    Height as of this encounter: 172.7 cm (67.99\").    Weight as of this encounter: 106 kg (233 lb 3.2 oz).               Review of Systems   Constitutional:  Positive for unexpected weight change. Negative for fatigue.   Eyes:  Negative for visual disturbance.   Cardiovascular:  Negative for palpitations.   Gastrointestinal:  Negative for abdominal pain, constipation, nausea and vomiting.   Endocrine: Negative for cold intolerance and heat intolerance.   Neurological:  Negative for dizziness, light-headedness and headaches.        Physical Exam   Result Review :  The following data was reviewed by: Francesca " Nokhbehzaeim, MD on 12/26/2023:  CMP          10/4/2023    11:51   CMP   Glucose 86    BUN 15    Creatinine 0.93    EGFR 88.9    Sodium 143    Potassium 4.1    Chloride 108    Calcium 9.3    Total Protein 6.7    Albumin 4.3    Globulin 2.4    Total Bilirubin 0.2    Alkaline Phosphatase 95    AST (SGOT) 16    ALT (SGPT) 23    Albumin/Globulin Ratio 1.8    BUN/Creatinine Ratio 16.1    Anion Gap 12.0      CBC w/diff          10/4/2023    11:51   CBC w/Diff   WBC 9.04    RBC 5.32    Hemoglobin 15.6    Hematocrit 46.1    MCV 86.7    MCH 29.3    MCHC 33.8    RDW 13.0    Platelets 177    Neutrophil Rel % 66.1    Immature Granulocyte Rel % 0.2    Lymphocyte Rel % 23.2    Monocyte Rel % 6.7    Eosinophil Rel % 3.2    Basophil Rel % 0.6      Lipid Panel          10/4/2023    11:51   Lipid Panel   Total Cholesterol 194    Triglycerides 155    HDL Cholesterol 43    VLDL Cholesterol 28    LDL Cholesterol  123    LDL/HDL Ratio 2.79      TSH          10/4/2023    11:51   TSH   TSH 4.100    Total Testosterone: 122               Assessment and Plan   Diagnoses and all orders for this visit:    1. Hypogonadism in male (Primary)  Assessment & Plan:  Never had a compr hormonal workup  Will check CBC, CMP, TSH and free T4  Will check LH and FSH and estradiol level  Will check prolactin level  Will check vitamin B12 and folate  Will obtain pituitary MRI  If the result is consistent with hypogonadism will start the testosterone supplements, patient prefers to do AndroGel and as above testosterone injection      Orders:  -     CBC (No Diff)  -     Comprehensive Metabolic Panel  -     Testosterone (Free & Total), LC / MS  -     TSH  -     T4, Free  -     Prolactin  -     Estradiol  -     Vitamin B12 & Folate  -     MRI Pituitary With & Without Contrast  -     FSH & LH           I spent 45 minutes caring for Durga on this date of service. This time includes time spent by me in the following activities:preparing for the visit, reviewing  tests, obtaining and/or reviewing a separately obtained history, performing a medically appropriate examination and/or evaluation , counseling and educating the patient/family/caregiver, ordering medications, tests, or procedures, and documenting information in the medical record  Follow Up   Return in about 3 months (around 3/26/2024).  Patient was given instructions and counseling regarding his condition or for health maintenance advice. Please see specific information pulled into the AVS if appropriate.

## 2023-12-27 ENCOUNTER — LAB (OUTPATIENT)
Dept: LAB | Facility: HOSPITAL | Age: 68
End: 2023-12-27
Payer: MEDICARE

## 2023-12-27 ENCOUNTER — PATIENT ROUNDING (BHMG ONLY) (OUTPATIENT)
Dept: ENDOCRINOLOGY | Age: 68
End: 2023-12-27
Payer: MEDICARE

## 2023-12-27 ENCOUNTER — OFFICE VISIT (OUTPATIENT)
Dept: UROLOGY | Facility: CLINIC | Age: 68
End: 2023-12-27
Payer: MEDICARE

## 2023-12-27 VITALS
DIASTOLIC BLOOD PRESSURE: 82 MMHG | SYSTOLIC BLOOD PRESSURE: 143 MMHG | HEART RATE: 69 BPM | HEIGHT: 68 IN | BODY MASS INDEX: 35.19 KG/M2 | WEIGHT: 232.2 LBS

## 2023-12-27 DIAGNOSIS — N40.1 BENIGN PROSTATIC HYPERPLASIA (BPH) WITH POST-VOID DRIBBLING: Primary | ICD-10-CM

## 2023-12-27 DIAGNOSIS — N39.43 BENIGN PROSTATIC HYPERPLASIA (BPH) WITH POST-VOID DRIBBLING: Primary | ICD-10-CM

## 2023-12-27 LAB
BILIRUB BLD-MCNC: NEGATIVE MG/DL
CLARITY, POC: CLEAR
COLOR UR: YELLOW
EXPIRATION DATE: ABNORMAL
GLUCOSE UR STRIP-MCNC: NEGATIVE MG/DL
KETONES UR QL: NEGATIVE
LEUKOCYTE EST, POC: ABNORMAL
Lab: ABNORMAL
NITRITE UR-MCNC: NEGATIVE MG/ML
PH UR: 7 [PH] (ref 5–8)
PROT UR STRIP-MCNC: NEGATIVE MG/DL
RBC # UR STRIP: NEGATIVE /UL
SP GR UR: 1.02 (ref 1–1.03)
URINE VOLUME: 0
UROBILINOGEN UR QL: NORMAL

## 2023-12-27 PROCEDURE — 85027 COMPLETE CBC AUTOMATED: CPT | Performed by: STUDENT IN AN ORGANIZED HEALTH CARE EDUCATION/TRAINING PROGRAM

## 2023-12-27 PROCEDURE — 80053 COMPREHEN METABOLIC PANEL: CPT | Performed by: STUDENT IN AN ORGANIZED HEALTH CARE EDUCATION/TRAINING PROGRAM

## 2023-12-27 PROCEDURE — 83002 ASSAY OF GONADOTROPIN (LH): CPT | Performed by: STUDENT IN AN ORGANIZED HEALTH CARE EDUCATION/TRAINING PROGRAM

## 2023-12-27 PROCEDURE — 84402 ASSAY OF FREE TESTOSTERONE: CPT | Performed by: STUDENT IN AN ORGANIZED HEALTH CARE EDUCATION/TRAINING PROGRAM

## 2023-12-27 PROCEDURE — 82746 ASSAY OF FOLIC ACID SERUM: CPT | Performed by: STUDENT IN AN ORGANIZED HEALTH CARE EDUCATION/TRAINING PROGRAM

## 2023-12-27 PROCEDURE — 84403 ASSAY OF TOTAL TESTOSTERONE: CPT | Performed by: STUDENT IN AN ORGANIZED HEALTH CARE EDUCATION/TRAINING PROGRAM

## 2023-12-27 PROCEDURE — 36415 COLL VENOUS BLD VENIPUNCTURE: CPT | Performed by: STUDENT IN AN ORGANIZED HEALTH CARE EDUCATION/TRAINING PROGRAM

## 2023-12-27 PROCEDURE — 84443 ASSAY THYROID STIM HORMONE: CPT | Performed by: STUDENT IN AN ORGANIZED HEALTH CARE EDUCATION/TRAINING PROGRAM

## 2023-12-27 PROCEDURE — 83001 ASSAY OF GONADOTROPIN (FSH): CPT | Performed by: STUDENT IN AN ORGANIZED HEALTH CARE EDUCATION/TRAINING PROGRAM

## 2023-12-27 PROCEDURE — 82670 ASSAY OF TOTAL ESTRADIOL: CPT | Performed by: STUDENT IN AN ORGANIZED HEALTH CARE EDUCATION/TRAINING PROGRAM

## 2023-12-27 PROCEDURE — 82607 VITAMIN B-12: CPT | Performed by: STUDENT IN AN ORGANIZED HEALTH CARE EDUCATION/TRAINING PROGRAM

## 2023-12-27 PROCEDURE — 84146 ASSAY OF PROLACTIN: CPT | Performed by: STUDENT IN AN ORGANIZED HEALTH CARE EDUCATION/TRAINING PROGRAM

## 2023-12-27 PROCEDURE — 84439 ASSAY OF FREE THYROXINE: CPT | Performed by: STUDENT IN AN ORGANIZED HEALTH CARE EDUCATION/TRAINING PROGRAM

## 2023-12-28 LAB
ALBUMIN SERPL-MCNC: 4 G/DL (ref 3.9–4.9)
ALBUMIN/GLOB SERPL: 2.2 {RATIO} (ref 1.2–2.2)
ALP SERPL-CCNC: 87 IU/L (ref 44–121)
ALT SERPL-CCNC: 19 IU/L (ref 0–44)
AST SERPL-CCNC: 16 IU/L (ref 0–40)
BILIRUB SERPL-MCNC: 0.5 MG/DL (ref 0–1.2)
BUN SERPL-MCNC: 15 MG/DL (ref 8–27)
BUN/CREAT SERPL: 16 (ref 10–24)
CALCIUM SERPL-MCNC: 8.9 MG/DL (ref 8.6–10.2)
CHLORIDE SERPL-SCNC: 106 MMOL/L (ref 96–106)
CO2 SERPL-SCNC: 22 MMOL/L (ref 20–29)
CREAT SERPL-MCNC: 0.92 MG/DL (ref 0.76–1.27)
EGFRCR SERPLBLD CKD-EPI 2021: 90 ML/MIN/1.73
ERYTHROCYTE [DISTWIDTH] IN BLOOD BY AUTOMATED COUNT: 12.9 % (ref 11.6–15.4)
ESTRADIOL SERPL-MCNC: 28.9 PG/ML (ref 7.6–42.6)
FOLATE SERPL-MCNC: >20 NG/ML
FSH SERPL-ACNC: 3.6 MIU/ML (ref 1.5–12.4)
GLOBULIN SER CALC-MCNC: 1.8 G/DL (ref 1.5–4.5)
GLUCOSE SERPL-MCNC: 90 MG/DL (ref 70–99)
HCT VFR BLD AUTO: 43.5 % (ref 37.5–51)
HGB BLD-MCNC: 14.8 G/DL (ref 13–17.7)
LH SERPL-ACNC: 2.9 MIU/ML (ref 1.7–8.6)
MCH RBC QN AUTO: 29.1 PG (ref 26.6–33)
MCHC RBC AUTO-ENTMCNC: 34 G/DL (ref 31.5–35.7)
MCV RBC AUTO: 86 FL (ref 79–97)
PLATELET # BLD AUTO: 181 X10E3/UL (ref 150–450)
POTASSIUM SERPL-SCNC: 4.5 MMOL/L (ref 3.5–5.2)
PROLACTIN SERPL-MCNC: 4.1 NG/ML (ref 3.6–25.2)
PROT SERPL-MCNC: 5.8 G/DL (ref 6–8.5)
RBC # BLD AUTO: 5.09 X10E6/UL (ref 4.14–5.8)
SODIUM SERPL-SCNC: 142 MMOL/L (ref 134–144)
T4 FREE SERPL-MCNC: 1.1 NG/DL (ref 0.82–1.77)
TSH SERPL DL<=0.005 MIU/L-ACNC: 3.56 UIU/ML (ref 0.45–4.5)
VIT B12 SERPL-MCNC: 838 PG/ML (ref 232–1245)
WBC # BLD AUTO: 10.4 X10E3/UL (ref 3.4–10.8)

## 2024-01-05 LAB
TESTOST FREE SERPL-MCNC: 2.3 PG/ML (ref 6.6–18.1)
TESTOST SERPL-MCNC: 126.3 NG/DL (ref 264–916)

## 2024-01-08 ENCOUNTER — PATIENT ROUNDING (BHMG ONLY) (OUTPATIENT)
Dept: SURGERY | Facility: CLINIC | Age: 69
End: 2024-01-08
Payer: MEDICARE

## 2024-01-08 NOTE — PROGRESS NOTES
January 8, 2024    Hello, may I speak with Durga Butts?    My name is Lilliam Qiu      I am  with [facility]  [facility]  [address]  [place] [address] [phone].    Before we get started may I verify your date of birth? 1955    I am calling to officially welcome you to our practice and ask about your recent visit. Is this a good time to talk? yes    Tell me about your visit with us. What things went well?  My appointment was okay, everything went fine.        We're always looking for ways to make our patients' experiences even better. Do you have recommendations on ways we may improve?  no    Overall were you satisfied with your first visit to our practice? yes       I appreciate you taking the time to speak with me today. Is there anything else I can do for you? no      Thank you, and have a great day.

## 2024-01-16 ENCOUNTER — TELEPHONE (OUTPATIENT)
Dept: INTERNAL MEDICINE | Facility: CLINIC | Age: 69
End: 2024-01-16

## 2024-01-16 ENCOUNTER — OFFICE VISIT (OUTPATIENT)
Dept: INTERNAL MEDICINE | Facility: CLINIC | Age: 69
End: 2024-01-16
Payer: MEDICARE

## 2024-01-16 VITALS
DIASTOLIC BLOOD PRESSURE: 92 MMHG | SYSTOLIC BLOOD PRESSURE: 142 MMHG | BODY MASS INDEX: 35.16 KG/M2 | HEIGHT: 68 IN | OXYGEN SATURATION: 98 % | WEIGHT: 232 LBS | TEMPERATURE: 96.1 F | HEART RATE: 62 BPM

## 2024-01-16 DIAGNOSIS — G89.29 CHRONIC LEFT SHOULDER PAIN: ICD-10-CM

## 2024-01-16 DIAGNOSIS — I10 PRIMARY HYPERTENSION: Chronic | ICD-10-CM

## 2024-01-16 DIAGNOSIS — M25.512 CHRONIC LEFT SHOULDER PAIN: ICD-10-CM

## 2024-01-16 DIAGNOSIS — M65.332 TRIGGER MIDDLE FINGER OF LEFT HAND: Primary | ICD-10-CM

## 2024-01-16 PROCEDURE — 99214 OFFICE O/P EST MOD 30 MIN: CPT | Performed by: PHYSICIAN ASSISTANT

## 2024-01-16 PROCEDURE — 3080F DIAST BP >= 90 MM HG: CPT | Performed by: PHYSICIAN ASSISTANT

## 2024-01-16 PROCEDURE — 1159F MED LIST DOCD IN RCRD: CPT | Performed by: PHYSICIAN ASSISTANT

## 2024-01-16 PROCEDURE — 1160F RVW MEDS BY RX/DR IN RCRD: CPT | Performed by: PHYSICIAN ASSISTANT

## 2024-01-16 PROCEDURE — 3077F SYST BP >= 140 MM HG: CPT | Performed by: PHYSICIAN ASSISTANT

## 2024-01-16 RX ORDER — LISINOPRIL 20 MG/1
20 TABLET ORAL DAILY
Qty: 90 TABLET | Refills: 1 | Status: SHIPPED | OUTPATIENT
Start: 2024-01-16

## 2024-01-16 RX ORDER — EZETIMIBE 10 MG/1
10 TABLET ORAL DAILY
Qty: 90 TABLET | Refills: 1 | Status: SHIPPED | OUTPATIENT
Start: 2024-01-16

## 2024-01-16 RX ORDER — EZETIMIBE 10 MG/1
10 TABLET ORAL DAILY
Qty: 30 TABLET | Refills: 0 | Status: SHIPPED | OUTPATIENT
Start: 2024-01-16 | End: 2024-01-16 | Stop reason: SDUPTHER

## 2024-01-16 RX ORDER — LISINOPRIL 20 MG/1
20 TABLET ORAL DAILY
Qty: 30 TABLET | Refills: 0 | Status: SHIPPED | OUTPATIENT
Start: 2024-01-16 | End: 2024-01-16 | Stop reason: SDUPTHER

## 2024-01-16 NOTE — PROGRESS NOTES
"Chief Complaint  Shoulder Pain (PT doesn't help) and trigger finger    Subjective          Dugra Butts presents to [facility]    Follow up    Left shoulder pain- symptoms present over the past couple months.  Patient has been doing exercises at home over the past 6 weeks with improvement.  He would like to continue to work on this until his next follow up appointment.     Trigger finger- left middle finger, pain, improved with direct pressure but not able to close his hand completely.  Patient has had an injection for this a few years ago and was told he likely would need repeat injection.     Elevated blood pressure readings- over the past couple weeks patient's BP has been higher than usual.  He has been monitoring over the past few days and it does seem to be going down into a more normal range.  No significant lifestyle changes during this time but does admit to eating more salty pretzels recently.  He denies facial flushing, headaches, chest pain or leg swelling.      Objective   Vital Signs:   /92   Pulse 62   Temp 96.1 °F (35.6 °C)   Ht 172.7 cm (68\")   Wt 105 kg (232 lb)   SpO2 98%   BMI 35.28 kg/m²     Physical Exam  Vitals reviewed.   Constitutional:       Appearance: Normal appearance. He is well-developed.   HENT:      Head: Normocephalic and atraumatic.   Eyes:      Conjunctiva/sclera: Conjunctivae normal.      Pupils: Pupils are equal, round, and reactive to light.   Cardiovascular:      Rate and Rhythm: Normal rate and regular rhythm.      Heart sounds: No murmur heard.     No friction rub. No gallop.   Pulmonary:      Effort: Pulmonary effort is normal.      Breath sounds: Normal breath sounds. No wheezing or rhonchi.   Musculoskeletal:         General: Tenderness (base of left third finger) present. Normal range of motion.   Skin:     General: Skin is warm and dry.   Neurological:      Mental Status: He is alert and oriented to " person, place, and time.      Cranial Nerves: No cranial nerve deficit.   Psychiatric:         Mood and Affect: Mood and affect normal.         Behavior: Behavior normal.         Thought Content: Thought content normal.         Judgment: Judgment normal.        Result Review :          Procedures      Assessment and Plan    Diagnoses and all orders for this visit:    1. Trigger middle finger of left hand (Primary)  Assessment & Plan:  Return to Orthopedics for evaluation and treatment.  Referral placed.    Orders:  -     Ambulatory Referral to Orthopedic Surgery    2. Primary hypertension  Assessment & Plan:  Slightly elevated in office, continue to monitor at home.  Decrease salt intake and increase water.  Monitor blood pressure daily and if readings are staying > 140/90 or he becomes symptomatic he is to call and let us know.  Follow up with PCP in 4-6 weeks to re-evaluate.       3. Chronic left shoulder pain  Assessment & Plan:  Continuing but improving with daily activities.  Discussed importance of discontinuing ibuprofen due to risk of kidney injury as well as GI side effects such as peptic ulcers. Patient agreeable.  If symptoms persist or worsen would suggest MRI or Orthopedic evaluation.              I spent 35 minutes caring for Durga on this date of service. This time includes time spent by me in the following activities:preparing for the visit, reviewing tests, obtaining and/or reviewing a separately obtained history, performing a medically appropriate examination and/or evaluation , counseling and educating the patient/family/caregiver, referring and communicating with other health care professionals , and documenting information in the medical record  Follow Up   No follow-ups on file.  Patient was given instructions and counseling regarding his condition or for health maintenance advice. Please see specific information pulled into the AVS if appropriate.

## 2024-01-16 NOTE — ASSESSMENT & PLAN NOTE
Continuing but improving with daily activities.  Discussed importance of discontinuing ibuprofen due to risk of kidney injury as well as GI side effects such as peptic ulcers. Patient agreeable.  If symptoms persist or worsen would suggest MRI or Orthopedic evaluation.

## 2024-01-16 NOTE — ASSESSMENT & PLAN NOTE
Slightly elevated in office, continue to monitor at home.  Decrease salt intake and increase water.  Monitor blood pressure daily and if readings are staying > 140/90 or he becomes symptomatic he is to call and let us know.  Follow up with PCP in 4-6 weeks to re-evaluate.

## 2024-01-24 ENCOUNTER — OFFICE VISIT (OUTPATIENT)
Dept: ORTHOPEDIC SURGERY | Facility: CLINIC | Age: 69
End: 2024-01-24
Payer: MEDICARE

## 2024-01-24 VITALS
HEART RATE: 59 BPM | HEIGHT: 68 IN | SYSTOLIC BLOOD PRESSURE: 125 MMHG | BODY MASS INDEX: 34.25 KG/M2 | DIASTOLIC BLOOD PRESSURE: 89 MMHG | WEIGHT: 226 LBS

## 2024-01-24 DIAGNOSIS — M25.512 LEFT SHOULDER PAIN, UNSPECIFIED CHRONICITY: Primary | ICD-10-CM

## 2024-01-24 DIAGNOSIS — M65.332 TRIGGER MIDDLE FINGER OF LEFT HAND: ICD-10-CM

## 2024-01-24 DIAGNOSIS — M75.122 COMPLETE TEAR OF LEFT ROTATOR CUFF, UNSPECIFIED WHETHER TRAUMATIC: ICD-10-CM

## 2024-01-24 RX ORDER — TRIAMCINOLONE ACETONIDE 40 MG/ML
40 INJECTION, SUSPENSION INTRA-ARTICULAR; INTRAMUSCULAR
Status: COMPLETED | OUTPATIENT
Start: 2024-01-24 | End: 2024-01-24

## 2024-01-24 RX ORDER — LIDOCAINE HYDROCHLORIDE 10 MG/ML
1 INJECTION, SOLUTION INFILTRATION; PERINEURAL
Status: COMPLETED | OUTPATIENT
Start: 2024-01-24 | End: 2024-01-24

## 2024-01-24 RX ORDER — MELOXICAM 7.5 MG/1
7.5 TABLET ORAL DAILY
Qty: 30 TABLET | Refills: 0 | Status: CANCELLED | OUTPATIENT
Start: 2024-01-24

## 2024-01-24 RX ADMIN — TRIAMCINOLONE ACETONIDE 40 MG: 40 INJECTION, SUSPENSION INTRA-ARTICULAR; INTRAMUSCULAR at 10:30

## 2024-01-24 RX ADMIN — LIDOCAINE HYDROCHLORIDE 1 ML: 10 INJECTION, SOLUTION INFILTRATION; PERINEURAL at 10:30

## 2024-01-24 NOTE — PROGRESS NOTES
"Chief Complaint  Follow-up and Pain of the Left Hand and Pain and Initial Evaluation of the Left Shoulder    Subjective          Durga Butts presents to [facility] for   History of Present Illness    The patient presents here today for follow up evaluation of the left upper extremity. He has been treating his left 4th trigger finger conservatively. He got relief with the previous injection. He reports his 3rd finger is now causing him pain. He reports over the past 4 weeks he has been getting pain to the shoulder with sleeping. He has pain with ROM and activity away from the body.     No Known Allergies     Social History     Socioeconomic History    Marital status:    Tobacco Use    Smoking status: Former     Packs/day: 0.50     Years: 30.00     Additional pack years: 0.00     Total pack years: 15.00     Types: Cigarettes     Quit date: 1995     Years since quittin.0     Passive exposure: Past    Smokeless tobacco: Never    Tobacco comments:     Started smoking when    Vaping Use    Vaping Use: Never used   Substance and Sexual Activity    Alcohol use: Not Currently    Drug use: Not Currently    Sexual activity: Defer        I reviewed the patient's chief complaint, history of present illness, review of systems, past medical history, surgical history, family history, social history, medications, and allergy list.     REVIEW OF SYSTEMS    Constitutional: Denies fevers, chills, weight loss  Cardiovascular: Denies chest pain, shortness of breath  Skin: Denies rashes, acute skin changes  Neurologic: Denies headache, loss of consciousness  MSK: Left upper extremity pain      Objective   Vital Signs:   /89   Pulse 59   Ht 172.7 cm (68\")   Wt 103 kg (226 lb)   BMI 34.36 kg/m²     Body mass index is 34.36 kg/m².    Physical Exam    General: Alert. No acute distress.   Left upper extremity- pain with flexion of the long finger. Tender to the A-1 pulley. " Tender to the A-1 pulley. Neurovascularly intact. Sensation to light touch median, radial, ulnar nerve. Positive AIN, PIN, ulnar nerve motor function. Positive pulses. Forward elevation of the shoulder is 130 with pain. External Rotation 30. Internal rotation to the lower lumbar. 4/5 rotator cuff testing. 4/5 infraspinatus, 5/5 subscapularis. Positive impingement signs.     Small Joint Arthrocentesis  Consent given by: patient  Site marked: site marked  Timeout: Immediately prior to procedure a time out was called to verify the correct patient, procedure, equipment, support staff and site/side marked as required   Procedure Details  Location: long finger (RIGHT) -   Preparation: Patient was prepped and draped in the usual sterile fashion  Medications administered: 1 mL lidocaine 1 %; 40 mg triamcinolone acetonide 40 MG/ML  Patient tolerance: patient tolerated the procedure well with no immediate complications        Imaging Results (Most Recent)       Procedure Component Value Units Date/Time    XR Shoulder 2+ View Left [[id number]] Resulted: 01/24/24 1058     Updated: 01/24/24 1102    Narrative:      Indications: Left shoulder pain     Views: AP, Grashey, Scap Y, axillary left shoulder    Findings: Mild degenerative changes in the glenohumeral and AC joints.    Glenohumeral joint reduced.  No fractures noted.    Comparative Data: No comparative data available                     Assessment and Plan        XR Shoulder 2+ View Left    Result Date: 1/24/2024  Narrative: Indications: Left shoulder pain Views: AP, Grashey, Scap Y, axillary left shoulder Findings: Mild degenerative changes in the glenohumeral and AC joints.  Glenohumeral joint reduced.  No fractures noted. Comparative Data: No comparative data available      Diagnoses and all orders for this visit:    1. Left shoulder pain, unspecified chronicity (Primary)  -     XR Shoulder 2+ View Left    2. Complete tear of left rotator cuff, unspecified whether  traumatic  -     MRI Shoulder Left Without Contrast; Future    3. Trigger middle finger of left hand    Other orders  -     Small Joint Arthrocentesis        Discussed the treatment plan with the patient.  I reviewed the x-rays that were obtained today with the patient. Discussed the risks and benefits of a left 3rd finger injection. The patient expressed understanding and wished to proceed. He tolerated the injection well.   Plan for MRI of the left shoulder to evaluate the rotator cuff. The patient expressed understanding and wished to proceed.       Call or return if worsening symptoms.    Scribed for Dennis Castorena MD by Amita Kenney  01/24/2024   10:40 EST       Follow Up       MRI results    Patient was given instructions and counseling regarding his condition or for health maintenance advice. Please see specific information pulled into the AVS if appropriate.       I have personally performed the services described in this document as scribed by the above individual and it is both accurate and complete.     Dennis Castorena MD  01/24/24  11:22 EST

## 2024-01-28 ENCOUNTER — HOSPITAL ENCOUNTER (OUTPATIENT)
Dept: MRI IMAGING | Facility: HOSPITAL | Age: 69
Discharge: HOME | End: 2024-01-28
Payer: MEDICARE

## 2024-01-28 DIAGNOSIS — M75.122 COMPLETE TEAR OF LEFT ROTATOR CUFF, UNSPECIFIED WHETHER TRAUMATIC: ICD-10-CM

## 2024-01-28 PROCEDURE — 0 GADOBENATE DIMEGLUMINE 529 MG/ML SOLUTION: Performed by: STUDENT IN AN ORGANIZED HEALTH CARE EDUCATION/TRAINING PROGRAM

## 2024-01-28 PROCEDURE — A9577 INJ MULTIHANCE: HCPCS | Performed by: STUDENT IN AN ORGANIZED HEALTH CARE EDUCATION/TRAINING PROGRAM

## 2024-01-28 PROCEDURE — 70553 MRI BRAIN STEM W/O & W/DYE: CPT

## 2024-01-28 PROCEDURE — 73221 MRI JOINT UPR EXTREM W/O DYE: CPT

## 2024-01-28 RX ADMIN — GADOBENATE DIMEGLUMINE 20 ML: 529 INJECTION, SOLUTION INTRAVENOUS at 14:42

## 2024-01-29 ENCOUNTER — TELEPHONE (OUTPATIENT)
Dept: ENDOCRINOLOGY | Age: 69
End: 2024-01-29
Payer: MEDICARE

## 2024-01-29 DIAGNOSIS — E29.1 HYPOGONADISM IN MALE: Primary | ICD-10-CM

## 2024-01-29 RX ORDER — TESTOSTERONE 2 MG/D
2 PATCH TRANSDERMAL NIGHTLY
Qty: 30 PATCH | Refills: 3 | Status: SHIPPED | OUTPATIENT
Start: 2024-01-29

## 2024-01-29 NOTE — TELEPHONE ENCOUNTER
Called and notified the patient regarding Pituitary MRI results.   It showed normal pituitary.    Will start, benefits and side effects discussed with patient.  Side effects: erythrocytosis, PE, DVT, headaches, might be associated with CVS events or prostate cancer patient verbalized understand.  RX was sent in for Androderm path, apply to back, abdomen , arm ro thigh.   Repeat Testosterone level in the morning before next visit.  Repeat CBC     Patient verbalized understanding.

## 2024-02-02 ENCOUNTER — OFFICE VISIT (OUTPATIENT)
Dept: ORTHOPEDIC SURGERY | Facility: CLINIC | Age: 69
End: 2024-02-02
Payer: MEDICARE

## 2024-02-02 VITALS
SYSTOLIC BLOOD PRESSURE: 127 MMHG | WEIGHT: 225 LBS | HEIGHT: 68 IN | HEART RATE: 72 BPM | DIASTOLIC BLOOD PRESSURE: 81 MMHG | BODY MASS INDEX: 34.1 KG/M2

## 2024-02-02 DIAGNOSIS — M75.22 BICEPS TENDONITIS ON LEFT: Primary | ICD-10-CM

## 2024-02-02 DIAGNOSIS — M75.112 INCOMPLETE TEAR OF LEFT ROTATOR CUFF, UNSPECIFIED WHETHER TRAUMATIC: ICD-10-CM

## 2024-02-02 DIAGNOSIS — M25.512 LEFT SHOULDER PAIN, UNSPECIFIED CHRONICITY: ICD-10-CM

## 2024-02-02 RX ORDER — TRIAMCINOLONE ACETONIDE 40 MG/ML
40 INJECTION, SUSPENSION INTRA-ARTICULAR; INTRAMUSCULAR
Status: COMPLETED | OUTPATIENT
Start: 2024-02-02 | End: 2024-02-02

## 2024-02-02 RX ORDER — LIDOCAINE HYDROCHLORIDE 10 MG/ML
5 INJECTION, SOLUTION INFILTRATION; PERINEURAL
Status: COMPLETED | OUTPATIENT
Start: 2024-02-02 | End: 2024-02-02

## 2024-02-02 RX ADMIN — LIDOCAINE HYDROCHLORIDE 5 ML: 10 INJECTION, SOLUTION INFILTRATION; PERINEURAL at 08:38

## 2024-02-02 RX ADMIN — TRIAMCINOLONE ACETONIDE 40 MG: 40 INJECTION, SUSPENSION INTRA-ARTICULAR; INTRAMUSCULAR at 08:38

## 2024-02-02 NOTE — PROGRESS NOTES
"Chief Complaint  Follow-up and Pain of the Left Shoulder    Subjective          Durga Butts presents to [facility] for   History of Present Illness    The patient is here today for MRI follow up of the left shoulder.  He still can't raise his shoulder above 90 degrees and has pain and has been taking anti inflammatory.  He has difficulty with sleeping and uses ibuprofen at night for pain relief.  He has difficulty with forward and upward ROM.  He is doing stretching exercises at home but notes the shoulder may even be a little worse then it was.      No Known Allergies     Social History     Socioeconomic History   • Marital status:    Tobacco Use   • Smoking status: Former     Packs/day: 0.50     Years: 30.00     Additional pack years: 0.00     Total pack years: 15.00     Types: Cigarettes     Quit date: 1995     Years since quittin.     Passive exposure: Past   • Smokeless tobacco: Never   • Tobacco comments:     Started smoking when    Vaping Use   • Vaping Use: Never used   Substance and Sexual Activity   • Alcohol use: Not Currently   • Drug use: Not Currently   • Sexual activity: Defer        I reviewed the patient's chief complaint, history of present illness, review of systems, past medical history, surgical history, family history, social history, medications, and allergy list.     REVIEW OF SYSTEMS    Constitutional: Denies fevers, chills, weight loss  Cardiovascular: Denies chest pain, shortness of breath  Skin: Denies rashes, acute skin changes  Neurologic: Denies headache, loss of consciousness        Objective   Vital Signs:   /81   Pulse 72   Ht 172.7 cm (68\")   Wt 102 kg (225 lb)   BMI 34.21 kg/m²     Body mass index is 34.21 kg/m².    Physical Exam    General: Alert. No acute distress.     LEFT SHOULDER Forward flexion 125. Abduction 90. External rotation 30. Internal rotation SI joint. Positive Cross body adduction. Supraspinatus " strength 4/5. Infraspinatus Strength 4/5. Infrared subscap 4/5. Positive Polo. Positive Neer. Negative Apprehension. Negative Lift off. (Negative Obriens. Sensation intact to light touch, median, radial, ulnar nerve. Positive AIN, PIN, ulnar nerve motor. Positive pulses. Positive Impingement signs. Good strength in triceps, biceps, deltoid, wrist extensors and wrist flexors. Tender to palpation to the anterior aspect of the shoulder and down the arm.  Tender to the bicipital groove.      Large Joint Arthrocentesis  Date/Time: 2/2/2024 8:38 AM  Consent given by: patient  Site marked: site marked  Timeout: Immediately prior to procedure a time out was called to verify the correct patient, procedure, equipment, support staff and site/side marked as required   Supporting Documentation  Indications: pain   Procedure Details  Location: shoulder (left) -   Needle gauge: 21g.  Medications administered: 5 mL lidocaine 1 %; 40 mg triamcinolone acetonide 40 MG/ML  Patient tolerance: patient tolerated the procedure well with no immediate complications      Imaging Results (Most Recent)       None                     Assessment and Plan        MRI Shoulder Left Without Contrast    Result Date: 1/29/2024  Narrative: PROCEDURE: MRI SHOULDER LEFT WO CONTRAST  COMPARISON: [facility] , CR, XR SHOULDER 2+ VW LEFT, 1/24/2024, 10:39.  INDICATIONS: Left shoulder pain, rotator cuff disorder suspected, xray done      TECHNIQUE: A variety of imaging planes and parameters were utilized for visualization of suspected pathology.  Images were performed without contrast.   FINDINGS:  There appears to be thinning of the distal supraspinatus tendon with mild irregular hyperintense signal along the distal articular surface.  Findings are suspicious for broad partial thickness articular surface tear/fraying.  This may also involve the anterior infraspinatus.  No definite full-thickness or high-grade focal tendon defect is identified.  The  teres minor and subscapularis tendons appear intact.  There is suspected mild enthesopathy at the greater and lesser tuberosities.   The long head of the biceps tendon appears normally positioned.  There is suspected tendinopathy of the intra-articular tendon without evidence of high-grade tear.  A small amount of fluid is present in the tendon sheath which may be seen with tenosynovitis or capsulitis.  Glenohumeral alignment appears within normal limits.  There is mild osteophyte formation and mild joint space narrowing with suspected diffuse cartilage thinning suggesting mild osteoarthritis.  No significant effusion.  There is suspected degeneration of the posterior inferior labrum.  There is suspicion for mild edema and thickening of the axillary joint capsule.  No acute fracture or suspicious marrow signal abnormality.  There appear to be mild degenerative changes at the acromioclavicular joint.  Alignment is within normal limits.  No significant periarticular edema.  No significant bursal fluid.  No axillary adenopathy.      Impression:   1. Findings of mild glenohumeral osteoarthritis and possible capsulitis.  Correlate with patient's symptoms. 2. Broad partial thickness articular surface tear/fraying of the supraspinatus.  No high-grade or full thickness tendon defect is seen at this time. 3. Biceps tendinopathy with fluid in the tendon sheath which could also be associated with capsulitis or tenosynovitis.      IVY ENGLAND MD       Electronically Signed and Approved By: IVY ENGLAND MD on 1/29/2024 at 10:59             MRI Pituitary With & Without Contrast    Result Date: 1/29/2024  Narrative: PROCEDURE: MRI PITUITARY W WO CONTRAST  COMPARISON: None.  INDICATIONS: 68-year-old male w/ h/o low testosterone & fatigue.   CONTRAST: 20 mL of MultiHance, gadobenate dimeglumine, I.V.  TECHNIQUE:  A variety of imaging planes and parameters were utilized for visualization of suspected pathology within the brain and  pituitary gland.  Eight hundred sixty-six (866) magnetic resonance (MR) images were obtained without and with intravenous gadolinium contrast.  Pituitary/sellar protocol was used, including post-contrast dynamic MR sequences.  Despite best efforts, poor image quality limits interpretation of the study, especially due to patient motion artifact.  Per the performing MRI technologist, the patient experienced nausea and vomiting after the intravenous administration of the gadolinium-based contrast agent (GBCA).  Therefore, the postcontrast imaging is limited.  FINDINGS:  PITUITARY: Normal size for this age and gender.  No visible lesion of the pituitary, infundibulum, suprasellar cistern, or cavernous sinus.  CEREBRUM: No edema, hemorrhage, mass, acute infarction, or inappropriate atrophy.  CEREBELLUM: No edema, hemorrhage, mass, acute infarction, or inappropriate atrophy.  BRAINSTEM: No edema, hemorrhage, mass, acute infarction, or inappropriate atrophy.  CSF SPACES: Ventricles, cisterns, and sulci are appropriate for age.  No hydrocephalus, subarachnoid hemorrhage, or mass.  SKULL: No mass or other significant visible lesion.  SINUSES: Mild age-indeterminate mucosal thickening and/or retained secretions involve(s) the imaged paranasal sinuses.  No air-fluid interfaces are seen within the imaged paranasal sinuses.  There is chronic leftward (7 mm) nasal septal deviation. ORBITS: The patient has undergone bilateral cataract extractions with intra-ocular lens implants. OTHER: No abnormal meningeal or parenchymal enhancement.  There are incidental degenerative changes of the partially imaged cervical spine.      Impression:  The study is motion-limited.  Grossly, no focal abnormality of the pituitary gland is appreciated.  No acute brain abnormality is seen.  No abnormal enhancement is identified.   Please note that portions of this note were completed with a voice recognition program.  HANDY CARR JR, MD        Electronically Signed and Approved By: HANDY CARR JR, MD on 1/29/2024 at 5:19              XR Shoulder 2+ View Left    Result Date: 1/24/2024  Narrative: Indications: Left shoulder pain Views: AP, Grashey, Scap Y, axillary left shoulder Findings: Mild degenerative changes in the glenohumeral and AC joints.  Glenohumeral joint reduced.  No fractures noted. Comparative Data: No comparative data available      Diagnoses and all orders for this visit:    1. Biceps tendonitis on left (Primary)    2. Left shoulder pain, unspecified chronicity    3. Incomplete tear of left rotator cuff, unspecified whether traumatic        Discussed the treatment plan with the patient. I reviewed the MRI results with the patient.     Discussed the risks and benefits of conservative measures. The patient expressed understanding and wished to proceed with a left shoulder steroid injection.  He tolerated the injection well.     Prescribed anti inflammatory Voltaren.     Call or return if worsening symptoms.    Scribed for Dennis Castorena MD by Anna Herrera MA  02/02/2024   08:09 EST         Follow Up       4-6 weeks assess if injection was helpful.      Patient was given instructions and counseling regarding his condition or for health maintenance advice. Please see specific information pulled into the AVS if appropriate.       I have personally performed the services described in this document as scribed by the above individual and it is both accurate and complete.     Dennis Castorena MD  02/02/24  08:23 EST

## 2024-02-05 ENCOUNTER — TELEPHONE (OUTPATIENT)
Dept: INTERNAL MEDICINE | Facility: CLINIC | Age: 69
End: 2024-02-05
Payer: MEDICARE

## 2024-02-05 NOTE — TELEPHONE ENCOUNTER
Ordering Provider and Authorizing Provider:  Nokhbehzaeim, Mahrokh, MD  [address] [facility] [address], [place] [address]       We cannot do pa for this patient as a provider from our office did not order it. Testosterone patches. Please advise

## 2024-02-06 DIAGNOSIS — E29.1 HYPOGONADISM IN MALE: Primary | ICD-10-CM

## 2024-02-06 RX ORDER — TESTOSTERONE 16.2 MG/G
GEL TRANSDERMAL
Qty: 75 G | Refills: 2 | Status: SHIPPED | OUTPATIENT
Start: 2024-02-06

## 2024-02-06 NOTE — TELEPHONE ENCOUNTER
Specialty Pharmacy Patient Management Program  Prescription Request       Requested Prescriptions     Pending Prescriptions Disp Refills    Testosterone (AndroGel Pump) 20.25 MG/ACT (1.62%) gel 75 g 2     Sig: Apply 2 pumps onto the skin daily as directed     Per your request to replace patches.     Pended for Dr. Nokhbehzaeim to review, and approve if appropriate.     Denice Nunez, PharmD, MM   Clinical Speciality Pharmacist, Endocrinology   2/6/2024  11:43 EST

## 2024-02-13 ENCOUNTER — TELEPHONE (OUTPATIENT)
Dept: INTERNAL MEDICINE | Facility: CLINIC | Age: 69
End: 2024-02-13

## 2024-02-13 NOTE — TELEPHONE ENCOUNTER
Caller: Durga Butts      Relationship: SELF     Best call back number: [phone]      Who are you requesting to speak with (clinical staff, provider,  specific staff member): RUPERT     What was the call regarding: STATES MEDICATION Testosterone (AndroGel Pump) 20.25 MG/ACT (1.62%) gel [[id number]] (Order [id number])  IS NEEDING PA AND PATIENT CLAIMS HE HAS REQUESTED AN UPDATE ON THIS MANY TIMES. PT UPSET AND WANTING A CALL BACK FROM MA/NURSE.

## 2024-02-13 NOTE — TELEPHONE ENCOUNTER
Ordering Provider and Authorizing Provider:  Nokhbehzaeim, Mahrokh, MD  [address] [facility] [address], [place] [address]         We cannot do pa for this patient as a provider from our office did not order it. Testosterone patches. Please advise

## 2024-02-15 ENCOUNTER — PRIOR AUTHORIZATION (OUTPATIENT)
Dept: ENDOCRINOLOGY | Age: 69
End: 2024-02-15
Payer: MEDICARE

## 2024-02-28 ENCOUNTER — OFFICE VISIT (OUTPATIENT)
Dept: INTERNAL MEDICINE | Facility: CLINIC | Age: 69
End: 2024-02-28
Payer: MEDICARE

## 2024-02-28 VITALS
DIASTOLIC BLOOD PRESSURE: 74 MMHG | HEIGHT: 68 IN | TEMPERATURE: 97.7 F | BODY MASS INDEX: 33.95 KG/M2 | HEART RATE: 72 BPM | OXYGEN SATURATION: 96 % | WEIGHT: 224 LBS | SYSTOLIC BLOOD PRESSURE: 120 MMHG | RESPIRATION RATE: 18 BRPM

## 2024-02-28 DIAGNOSIS — E78.2 MIXED HYPERLIPIDEMIA: Primary | ICD-10-CM

## 2024-02-28 DIAGNOSIS — M75.22 BICEPS TENDONITIS ON LEFT: ICD-10-CM

## 2024-02-28 DIAGNOSIS — M75.112 INCOMPLETE TEAR OF LEFT ROTATOR CUFF, UNSPECIFIED WHETHER TRAUMATIC: Primary | ICD-10-CM

## 2024-02-28 DIAGNOSIS — I10 PRIMARY HYPERTENSION: ICD-10-CM

## 2024-02-28 DIAGNOSIS — R25.2 LEG CRAMPS: ICD-10-CM

## 2024-02-28 PROCEDURE — 3074F SYST BP LT 130 MM HG: CPT | Performed by: NURSE PRACTITIONER

## 2024-02-28 PROCEDURE — 3078F DIAST BP <80 MM HG: CPT | Performed by: NURSE PRACTITIONER

## 2024-02-28 PROCEDURE — 99214 OFFICE O/P EST MOD 30 MIN: CPT | Performed by: NURSE PRACTITIONER

## 2024-02-28 RX ORDER — CYCLOBENZAPRINE HCL 10 MG
10 TABLET ORAL 3 TIMES DAILY PRN
Qty: 30 TABLET | Refills: 0 | Status: SHIPPED | OUTPATIENT
Start: 2024-02-28

## 2024-02-28 RX ORDER — FOLIC ACID 0.8 MG
1 TABLET ORAL DAILY
COMMUNITY

## 2024-02-28 NOTE — PROGRESS NOTES
"Chief Complaint  Hypertension (6 week follow up ), testosterone (PA needed for testosterone patches. ), and Shoulder Pain (Seeing Ortho. Patient is receiving injections now. )    Subjective          Durga Butts presents to [facility]  History of Present Illness  HTN-well controlled  Denies chest pain, headache, dizziness, blurred vision    He has started taking otc magnesium due to leg cramps  Exercising more    He will be going out of town-father is in palliative care    He is seeing ortho for shoulder pain-significant improvement with injections    He is seeing endo for testosterone    Had first shingrix   Objective   Vital Signs:   /74 (BP Location: Left arm, Patient Position: Sitting, Cuff Size: Large Adult)   Pulse 72   Temp 97.7 °F (36.5 °C)   Resp 18   Ht 172.7 cm (68\")   Wt 102 kg (224 lb)   SpO2 96%   BMI 34.06 kg/m²     Physical Exam  Vitals and nursing note reviewed.   Constitutional:       General: He is not in acute distress.     Appearance: Normal appearance.   HENT:      Head: Normocephalic and atraumatic.      Right Ear: Tympanic membrane, ear canal and external ear normal.      Left Ear: Tympanic membrane, ear canal and external ear normal.      Nose: Nose normal.      Mouth/Throat:      Mouth: Mucous membranes are moist.   Eyes:      Conjunctiva/sclera: Conjunctivae normal.   Cardiovascular:      Rate and Rhythm: Normal rate and regular rhythm.      Pulses: Normal pulses.      Heart sounds: Normal heart sounds. No murmur heard.     No friction rub. No gallop.   Pulmonary:      Effort: Pulmonary effort is normal. No respiratory distress.      Breath sounds: No wheezing, rhonchi or rales.   Musculoskeletal:      Cervical back: Neck supple.      Right lower leg: No edema.      Left lower leg: No edema.   Skin:     General: Skin is warm and dry.   Neurological:      General: No focal deficit present.      Mental Status: He is alert and " oriented to person, place, and time.   Psychiatric:         Mood and Affect: Mood normal.         Behavior: Behavior normal.        Result Review :          Procedures      Assessment and Plan    Diagnoses and all orders for this visit:    1. Mixed hyperlipidemia (Primary)  Comments:  recheck labs in 1 mth. cont zetia at his time  Orders:  -     Lipid Panel; Future  -     Comprehensive Metabolic Panel; Future    2. Primary hypertension  Comments:  well controlled, cont current meds    3. Leg cramps  Comments:  Improved with magnesium.              Follow Up   No follow-ups on file.  Patient was given instructions and counseling regarding his condition or for health maintenance advice. Please see specific information pulled into the AVS if appropriate.

## 2024-03-11 DIAGNOSIS — E29.1 HYPOGONADISM IN MALE: ICD-10-CM

## 2024-03-11 RX ORDER — TESTOSTERONE 16.2 MG/G
GEL TRANSDERMAL
Qty: 225 G | Refills: 0 | Status: SHIPPED | OUTPATIENT
Start: 2024-03-11

## 2024-03-12 ENCOUNTER — TELEPHONE (OUTPATIENT)
Dept: ORTHOPEDIC SURGERY | Facility: CLINIC | Age: 69
End: 2024-03-12
Payer: MEDICARE

## 2024-03-12 DIAGNOSIS — M75.122 COMPLETE TEAR OF LEFT ROTATOR CUFF, UNSPECIFIED WHETHER TRAUMATIC: ICD-10-CM

## 2024-03-12 DIAGNOSIS — M25.512 LEFT SHOULDER PAIN, UNSPECIFIED CHRONICITY: Primary | ICD-10-CM

## 2024-03-12 RX ORDER — CYCLOBENZAPRINE HCL 10 MG
10 TABLET ORAL 3 TIMES DAILY PRN
Qty: 30 TABLET | Refills: 0 | Status: SHIPPED | OUTPATIENT
Start: 2024-03-12

## 2024-03-12 NOTE — TELEPHONE ENCOUNTER
PATIENT CALLED AND REQUESTING SCRIP AT [facility] BE CANCELED AND SCRIP OF [facility]LEXERIL SENT TO [facility].  I CALLED AND CANCELED THE [facility] AND SENT REQUEST TO [facility].

## 2024-03-13 DIAGNOSIS — M75.112 INCOMPLETE TEAR OF LEFT ROTATOR CUFF, UNSPECIFIED WHETHER TRAUMATIC: ICD-10-CM

## 2024-03-13 DIAGNOSIS — M75.22 BICEPS TENDONITIS ON LEFT: ICD-10-CM

## 2024-03-13 DIAGNOSIS — M25.512 LEFT SHOULDER PAIN, UNSPECIFIED CHRONICITY: ICD-10-CM

## 2024-03-14 RX ORDER — LISINOPRIL 20 MG/1
20 TABLET ORAL DAILY
Qty: 90 TABLET | Refills: 1 | Status: SHIPPED | OUTPATIENT
Start: 2024-03-14

## 2024-03-14 RX ORDER — EZETIMIBE 10 MG/1
10 TABLET ORAL DAILY
Qty: 90 TABLET | Refills: 1 | Status: SHIPPED | OUTPATIENT
Start: 2024-03-14

## 2024-03-14 NOTE — TELEPHONE ENCOUNTER
Rx Refill Note  Requested Prescriptions     Pending Prescriptions Disp Refills    ezetimibe (ZETIA) 10 MG tablet 90 tablet 1     Sig: Take 1 tablet by mouth Daily.    lisinopril (PRINIVIL,ZESTRIL) 20 MG tablet 90 tablet 1     Sig: Take 1 tablet by mouth Daily.      Last office visit with prescribing clinician: 2/28/2024   Last telemedicine visit with prescribing clinician: Visit date not found   Next office visit with prescribing clinician: Visit date not found                         Would you like a call back once the refill request has been completed: [] Yes [] No    If the office needs to give you a call back, can they leave a voicemail: [] Yes [] No    Erica Roldan MA  03/14/24, 11:08 EDT

## 2024-04-01 ENCOUNTER — TELEPHONE (OUTPATIENT)
Dept: ENDOCRINOLOGY | Age: 69
End: 2024-04-01
Payer: MEDICARE

## 2024-04-01 NOTE — TELEPHONE ENCOUNTER
Caller: Durga Butts    Relationship to patient: Self    Best call back number: [phone]     Patient is needing:   PATIENT STATES HE WAS PRESCRIBED TESTOSTERONE PATCHES BY DR.NOKHBEHZAEIM BUT  DENIED IT DUE TO NEEDING A PRIOR AUTH THAT WAS NOT SENT. THEN,  SENT IN A PRESCRIPTION FOR  Testosterone (AndroGel Pump) 20.25 MG/ACT (1.62%) gel [[id number]] (Order [id number]) AND  STATES THEY NOW NEED A PRIOR AUTH SENT IN FOR THAT.    PLEASE CALL PATIENT WHEN PRIOR AUTH HAS BEEN SENT.

## 2024-04-02 NOTE — TELEPHONE ENCOUNTER
Holli Dunaway MA submitted PA on 02/15/2024.      Pa submitted for Androgel Pump 1.62%   Key: [id number]  PA Case ID #: [id number]

## 2024-04-03 DIAGNOSIS — E29.1 HYPOGONADISM IN MALE: Primary | ICD-10-CM

## 2024-04-03 RX ORDER — TESTOSTERONE CYPIONATE 200 MG/ML
100 VIAL (ML) INTRAMUSCULAR
Qty: 1.96 ML | Refills: 1 | Status: SHIPPED | OUTPATIENT
Start: 2024-04-03

## 2024-04-03 RX ORDER — SYRINGE W-NEEDLE,DISPOSAB,3 ML 25GX5/8"
SYRINGE, EMPTY DISPOSABLE MISCELLANEOUS
Qty: 10 EACH | Refills: 1 | Status: SHIPPED | OUTPATIENT
Start: 2024-04-03

## 2024-05-03 ENCOUNTER — OFFICE VISIT (OUTPATIENT)
Dept: ORTHOPEDIC SURGERY | Facility: CLINIC | Age: 69
End: 2024-05-03
Payer: MEDICARE

## 2024-05-03 VITALS
OXYGEN SATURATION: 95 % | WEIGHT: 224 LBS | DIASTOLIC BLOOD PRESSURE: 89 MMHG | HEART RATE: 64 BPM | BODY MASS INDEX: 33.95 KG/M2 | SYSTOLIC BLOOD PRESSURE: 153 MMHG | HEIGHT: 68 IN

## 2024-05-03 DIAGNOSIS — M75.22 BICEPS TENDONITIS ON LEFT: ICD-10-CM

## 2024-05-03 DIAGNOSIS — M25.512 LEFT SHOULDER PAIN, UNSPECIFIED CHRONICITY: ICD-10-CM

## 2024-05-03 DIAGNOSIS — M75.112 INCOMPLETE TEAR OF LEFT ROTATOR CUFF, UNSPECIFIED WHETHER TRAUMATIC: Primary | ICD-10-CM

## 2024-05-03 RX ORDER — TRIAMCINOLONE ACETONIDE 40 MG/ML
40 INJECTION, SUSPENSION INTRA-ARTICULAR; INTRAMUSCULAR
Status: DISCONTINUED | OUTPATIENT
Start: 2024-05-03 | End: 2024-05-03

## 2024-05-03 RX ORDER — LIDOCAINE HYDROCHLORIDE 10 MG/ML
5 INJECTION, SOLUTION INFILTRATION; PERINEURAL
Status: DISCONTINUED | OUTPATIENT
Start: 2024-05-03 | End: 2024-05-03

## 2024-05-03 NOTE — PROGRESS NOTES
"Chief Complaint  Follow-up and Pain of the Left Shoulder    Subjective          Durga Butts presents to [facility]   History of Present Illness    Durga Butts presents today for a follow-up of his left shoulder.  Patient has left biceps tendinitis and incomplete left rotator cuff tear that we are managing conservatively.  Today, patient states that he is doing okay.  He reports that he got good relief from the previous right shoulder steroid injection a few weeks after the injection.  Patient today has good shoulder range of motion and strength continues to improve.  He is taking his anti-inflammatories only as needed.  He continues to do his home exercises.      No Known Allergies     Social History     Socioeconomic History    Marital status:    Tobacco Use    Smoking status: Former     Current packs/day: 0.00     Average packs/day: 0.5 packs/day for 30.0 years (15.0 ttl pk-yrs)     Types: Cigarettes     Start date: 1965     Quit date: 1995     Years since quittin.3     Passive exposure: Past    Smokeless tobacco: Never    Tobacco comments:     Started smoking when    Vaping Use    Vaping status: Never Used   Substance and Sexual Activity    Alcohol use: Not Currently    Drug use: Not Currently    Sexual activity: Defer        I reviewed the patient's chief complaint, history of present illness, review of systems, past medical history, surgical history, family history, social history, medications, and allergy list.     REVIEW OF SYSTEMS    Constitutional: Denies fevers, chills, weight loss  Cardiovascular: Denies chest pain, shortness of breath  Skin: Denies rashes, acute skin changes  Neurologic: Denies headache, loss of consciousness  MSK: Left shoulder pain      Objective   Vital Signs:   /89   Pulse 64   Ht 172.7 cm (68\")   Wt 102 kg (224 lb)   SpO2 95%   BMI 34.06 kg/m²     Body mass index is 34.06 kg/m².    Physical " Exam    General: Alert. No acute distress.   Left upper extremity: Active elevation 180.  External rotation 80.  Internal rotation to lower thoracic spine.  5 out of 5 rotator cuff testing.  Elbow range of motion intact.  Forearm soft.  Active finger and wrist range of motion.  Thumb opposition intact.  Palmar abduction of thumb intact.  Sensation intact to the axillary, median, radial, and ulnar nerve distributions.  Palpable radial pulse.    Date/Time: 5/3/2024 10:45 AM        Imaging Results (Most Recent)       None                   Assessment and Plan    Diagnoses and all orders for this visit:    1. Incomplete tear of left rotator cuff, unspecified whether traumatic (Primary)  -     Large Joint Arthrocentesis  -     Discontinue: lidocaine (XYLOCAINE) 1 % injection 5 mL  -     Discontinue: triamcinolone acetonide (KENALOG-40) injection 40 mg    2. Biceps tendonitis on left  -     Discontinue: lidocaine (XYLOCAINE) 1 % injection 5 mL  -     Discontinue: triamcinolone acetonide (KENALOG-40) injection 40 mg    3. Left shoulder pain, unspecified chronicity      No procedures were performed during today's visit. Jennie Peter PA-C. 5/3/2024.    Durga Butts presents today to follow-up with his incomplete left rotator cuff tear and biceps tendinitis that we are treating conservatively.  Patient instructed to continue with his home exercises.  We discussed importance of maintaining shoulder range of motion.  Continue with anti-inflammatories as needed.  We discussed repeat left shoulder steroid injections in the future if needed.      Patient will follow up as needed.      Call or return if symptoms worsen or patient has any concerns.       Follow Up   Return if symptoms worsen or fail to improve.  Patient was given instructions and counseling regarding his condition or for health maintenance advice. Please see specific information pulled into the AVS if appropriate.     Jennie Peter PA-C  05/03/24  10:50  EDT

## 2024-06-10 ENCOUNTER — TELEPHONE (OUTPATIENT)
Dept: INTERNAL MEDICINE | Facility: CLINIC | Age: 69
End: 2024-06-10

## 2024-06-10 NOTE — TELEPHONE ENCOUNTER
Caller: ButtsDurga    Relationship: Self    Best call back number: [phone]     What is the best time to reach you: ANY     Who are you requesting to speak with (clinical staff, provider,  specific staff member): CLINICAL     What was the call regarding: CALLER STATED THAT HIS  FOR LIFE HAS DENIED ALL FORMS OF TESTOSTERONE AND HE HAS NOT BEEN ABLE TO REACH A PERSON TO HELP WITH THIS PROCESS.     Is it okay if the provider responds through MyChart: YES

## 2024-06-12 NOTE — TELEPHONE ENCOUNTER
Called and spoke wt pt and his aware about it, his is going to try to get ahold wt pharmacy and see if can get the medication

## 2024-06-13 ENCOUNTER — CLINICAL SUPPORT (OUTPATIENT)
Dept: INTERNAL MEDICINE | Facility: CLINIC | Age: 69
End: 2024-06-13
Payer: MEDICARE

## 2024-06-13 DIAGNOSIS — E29.1 HYPOGONADISM IN MALE: ICD-10-CM

## 2024-06-13 DIAGNOSIS — E78.2 MIXED HYPERLIPIDEMIA: ICD-10-CM

## 2024-06-13 LAB
ALBUMIN SERPL-MCNC: 4.2 G/DL (ref 3.5–5.2)
ALBUMIN/GLOB SERPL: 1.8 G/DL
ALP SERPL-CCNC: 92 U/L (ref 39–117)
ALT SERPL W P-5'-P-CCNC: 21 U/L (ref 1–41)
ANION GAP SERPL CALCULATED.3IONS-SCNC: 11.1 MMOL/L (ref 5–15)
AST SERPL-CCNC: 16 U/L (ref 1–40)
BASOPHILS # BLD AUTO: 0.03 10*3/MM3 (ref 0–0.2)
BASOPHILS NFR BLD AUTO: 0.4 % (ref 0–1.5)
BILIRUB SERPL-MCNC: 0.4 MG/DL (ref 0–1.2)
BUN SERPL-MCNC: 23 MG/DL (ref 8–23)
BUN/CREAT SERPL: 22.1 (ref 7–25)
CALCIUM SPEC-SCNC: 9.1 MG/DL (ref 8.6–10.5)
CHLORIDE SERPL-SCNC: 106 MMOL/L (ref 98–107)
CHOLEST SERPL-MCNC: 179 MG/DL (ref 0–200)
CO2 SERPL-SCNC: 23.9 MMOL/L (ref 22–29)
CREAT SERPL-MCNC: 1.04 MG/DL (ref 0.76–1.27)
DEPRECATED RDW RBC AUTO: 39.7 FL (ref 37–54)
EGFRCR SERPLBLD CKD-EPI 2021: 77.2 ML/MIN/1.73
EOSINOPHIL # BLD AUTO: 0.21 10*3/MM3 (ref 0–0.4)
EOSINOPHIL NFR BLD AUTO: 2.7 % (ref 0.3–6.2)
ERYTHROCYTE [DISTWIDTH] IN BLOOD BY AUTOMATED COUNT: 12.9 % (ref 12.3–15.4)
GLOBULIN UR ELPH-MCNC: 2.4 GM/DL
GLUCOSE SERPL-MCNC: 95 MG/DL (ref 65–99)
HCT VFR BLD AUTO: 46.4 % (ref 37.5–51)
HDLC SERPL-MCNC: 38 MG/DL (ref 40–60)
HGB BLD-MCNC: 15.6 G/DL (ref 13–17.7)
IMM GRANULOCYTES # BLD AUTO: 0.02 10*3/MM3 (ref 0–0.05)
IMM GRANULOCYTES NFR BLD AUTO: 0.3 % (ref 0–0.5)
LDLC SERPL CALC-MCNC: 125 MG/DL (ref 0–100)
LDLC/HDLC SERPL: 3.26 {RATIO}
LYMPHOCYTES # BLD AUTO: 1.61 10*3/MM3 (ref 0.7–3.1)
LYMPHOCYTES NFR BLD AUTO: 20.9 % (ref 19.6–45.3)
MCH RBC QN AUTO: 29 PG (ref 26.6–33)
MCHC RBC AUTO-ENTMCNC: 33.6 G/DL (ref 31.5–35.7)
MCV RBC AUTO: 86.2 FL (ref 79–97)
MONOCYTES # BLD AUTO: 0.59 10*3/MM3 (ref 0.1–0.9)
MONOCYTES NFR BLD AUTO: 7.7 % (ref 5–12)
NEUTROPHILS NFR BLD AUTO: 5.24 10*3/MM3 (ref 1.7–7)
NEUTROPHILS NFR BLD AUTO: 68 % (ref 42.7–76)
NRBC BLD AUTO-RTO: 0 /100 WBC (ref 0–0.2)
PLATELET # BLD AUTO: 198 10*3/MM3 (ref 140–450)
PMV BLD AUTO: 10.6 FL (ref 6–12)
POTASSIUM SERPL-SCNC: 4 MMOL/L (ref 3.5–5.2)
PROT SERPL-MCNC: 6.6 G/DL (ref 6–8.5)
RBC # BLD AUTO: 5.38 10*6/MM3 (ref 4.14–5.8)
SODIUM SERPL-SCNC: 141 MMOL/L (ref 136–145)
TESTOST SERPL-MCNC: 120 NG/DL (ref 193–740)
TRIGL SERPL-MCNC: 86 MG/DL (ref 0–150)
VLDLC SERPL-MCNC: 16 MG/DL (ref 5–40)
WBC NRBC COR # BLD AUTO: 7.7 10*3/MM3 (ref 3.4–10.8)

## 2024-06-13 PROCEDURE — 84403 ASSAY OF TOTAL TESTOSTERONE: CPT | Performed by: NURSE PRACTITIONER

## 2024-06-13 PROCEDURE — 80061 LIPID PANEL: CPT | Performed by: NURSE PRACTITIONER

## 2024-06-13 PROCEDURE — 80053 COMPREHEN METABOLIC PANEL: CPT | Performed by: NURSE PRACTITIONER

## 2024-06-13 PROCEDURE — 36415 COLL VENOUS BLD VENIPUNCTURE: CPT | Performed by: NURSE PRACTITIONER

## 2024-06-13 PROCEDURE — 85025 COMPLETE CBC W/AUTO DIFF WBC: CPT | Performed by: STUDENT IN AN ORGANIZED HEALTH CARE EDUCATION/TRAINING PROGRAM

## 2024-06-14 ENCOUNTER — OFFICE VISIT (OUTPATIENT)
Dept: ORTHOPEDIC SURGERY | Facility: CLINIC | Age: 69
End: 2024-06-14
Payer: MEDICARE

## 2024-06-14 VITALS
OXYGEN SATURATION: 95 % | WEIGHT: 221 LBS | BODY MASS INDEX: 33.49 KG/M2 | HEIGHT: 68 IN | HEART RATE: 68 BPM | DIASTOLIC BLOOD PRESSURE: 99 MMHG | SYSTOLIC BLOOD PRESSURE: 157 MMHG

## 2024-06-14 DIAGNOSIS — M65.332 TRIGGER MIDDLE FINGER OF LEFT HAND: Primary | ICD-10-CM

## 2024-06-14 RX ORDER — TRIAMCINOLONE ACETONIDE 40 MG/ML
40 INJECTION, SUSPENSION INTRA-ARTICULAR; INTRAMUSCULAR
Status: COMPLETED | OUTPATIENT
Start: 2024-06-14 | End: 2024-06-14

## 2024-06-14 RX ORDER — LIDOCAINE HYDROCHLORIDE 10 MG/ML
1 INJECTION, SOLUTION INFILTRATION; PERINEURAL
Status: COMPLETED | OUTPATIENT
Start: 2024-06-14 | End: 2024-06-14

## 2024-06-14 RX ADMIN — LIDOCAINE HYDROCHLORIDE 1 ML: 10 INJECTION, SOLUTION INFILTRATION; PERINEURAL at 14:00

## 2024-06-14 RX ADMIN — TRIAMCINOLONE ACETONIDE 40 MG: 40 INJECTION, SUSPENSION INTRA-ARTICULAR; INTRAMUSCULAR at 14:00

## 2024-06-14 NOTE — PROGRESS NOTES
"Chief Complaint  Pain and Follow-up of the Left Shoulder and Pain and Follow-up of the Left Hand    Subjective          Durga Butts presents to [facility] for   Pain      Durga returns today for follow-up of his left hand.  He has a left third trigger finger we are treating conservatively.  He has had a trigger injection in the past and did very well.  He reports his symptoms have started to return.  He is not experiencing any locking.  He is interested in a repeat trigger finger injection.    No Known Allergies     Social History     Socioeconomic History    Marital status:    Tobacco Use    Smoking status: Former     Current packs/day: 0.00     Average packs/day: 0.5 packs/day for 30.0 years (15.0 ttl pk-yrs)     Types: Cigarettes     Start date: 1965     Quit date: 1995     Years since quittin.4     Passive exposure: Past    Smokeless tobacco: Never    Tobacco comments:     Started smoking when    Vaping Use    Vaping status: Never Used   Substance and Sexual Activity    Alcohol use: Not Currently    Drug use: Not Currently    Sexual activity: Defer        I reviewed the patient's chief complaint, history of present illness, review of systems, past medical history, surgical history, family history, social history, medications, and allergy list.     REVIEW OF SYSTEMS    Constitutional: Denies fevers, chills, weight loss  Cardiovascular: Denies chest pain, shortness of breath  Skin: Denies rashes, acute skin changes  Neurologic: Denies headache, loss of consciousness  MSK: Left hand pain      Objective   Vital Signs:   /99   Pulse 68   Ht 172.7 cm (68\")   Wt 100 kg (221 lb)   SpO2 95%   BMI 33.60 kg/m²     Body mass index is 33.6 kg/m².    Physical Exam    General: Alert. No acute distress.   Left upper extremity: Tender over the A1 pulley.  Full extension and flexion.  A palpable nodule is present.  No locking noted.  Neurovascular " intact.    Small Joint Arthrocentesis  Consent given by: patient  Site marked: site marked  Timeout: Immediately prior to procedure a time out was called to verify the correct patient, procedure, equipment, support staff and site/side marked as required   Procedure Details  Location: long finger (LEFT) -   Preparation: Patient was prepped and draped in the usual sterile fashion  Needle gauge: 23 G.  Medications administered: 1 mL lidocaine 1 %; 40 mg triamcinolone acetonide 40 MG/ML  Patient tolerance: patient tolerated the procedure well with no immediate complications      This injection documentation was Scribed for Dennis Castorena MD by Katelyn Vasquez.  06/14/24   14:07 EDT    Imaging Results (Most Recent)       None                     Assessment and Plan        No results found.     Diagnoses and all orders for this visit:    1. Trigger middle finger of left hand (Primary)    Other orders  -     Small Joint Arthrocentesis        We discussed the risk, benefits, indications, alternatives to a left third trigger finger injection.  He elected proceed and tolerated the injection well.  He may follow-up as needed.      Call or return if worsening symptoms.    Scribed for Dennis Castorena MD by Katelyn Vasquez  06/14/2024   14:05 EDT         Follow Up       As needed    I have personally performed the services described in this document as scribed by the above individual and it is both accurate and complete. Dennis Castorena MD 06/14/24 14:57 EDT    Patient was given instructions and counseling regarding his condition or for health maintenance advice. Please see specific information pulled into the AVS if appropriate.

## 2024-06-17 ENCOUNTER — TELEPHONE (OUTPATIENT)
Dept: ENDOCRINOLOGY | Age: 69
End: 2024-06-17
Payer: MEDICARE

## 2024-06-17 NOTE — TELEPHONE ENCOUNTER
Called and discussed the result  Has not started TRT yet  Unfortunately his insurance does not cover testosterone supplements  He is trying to talk to Polly to see what form of testosterone supplement is covered.

## 2024-06-20 ENCOUNTER — TELEPHONE (OUTPATIENT)
Dept: UROLOGY | Facility: CLINIC | Age: 69
End: 2024-06-20
Payer: MEDICARE

## 2024-06-20 NOTE — TELEPHONE ENCOUNTER
CALLED PT TO OFFER R/S OF CX'D APPT 6/27 W/ SAURABH    SPOKE W/ PT WHO STATED HE IS CURRENTLY IN [place] AND DECLINED TO R/S.    ANYTHING ELSE TO DO?

## 2024-10-22 ENCOUNTER — TELEPHONE (OUTPATIENT)
Dept: INTERNAL MEDICINE | Facility: CLINIC | Age: 69
End: 2024-10-22
Payer: MEDICARE

## 2024-10-22 NOTE — TELEPHONE ENCOUNTER
Called and left message for patient to call office and give last blood pressure reading if they monitor it at home   Increase Seroquel 50mg TID  Start Lithium 300mg BID 1. Lithium titration ( chronic suicidality with a h/o passive SI in the context of anxiety and substance use)  2.Seroquel 50 mg po tid ( mood / anxiety)  3.Encourage the pt to attend therapy groups  4.Disposition planning ongoing 1. DC Lithium as not clearly warranted at this time  2.To lower Seroquel dose to prior only recently begun ( 2-3 days) Seroquel 25 mg po bid ( mood / anxiety). To consider DC entirely due to potential risk of misuse/ abuse.  3.Pt has submitted a 72 hr letter on 6/20/17 requesting DC from hospital.  4.Disposition planning ongoing 1. Lithium titration ( chronic suicidality with a h/o passive SI in the context of anxiety and substance use)  2.Seroquel 50 mg po tid ( mood / anxiety)  3.Encourage the pt to attend therapy groups  4.Disposition planning ongoing

## 2024-10-31 RX ORDER — LISINOPRIL 20 MG/1
20 TABLET ORAL DAILY
Qty: 90 TABLET | Refills: 1 | Status: SHIPPED | OUTPATIENT
Start: 2024-10-31 | End: 2024-11-01 | Stop reason: SDUPTHER

## 2024-10-31 RX ORDER — EZETIMIBE 10 MG/1
10 TABLET ORAL DAILY
Qty: 90 TABLET | Refills: 1 | Status: SHIPPED | OUTPATIENT
Start: 2024-10-31 | End: 2024-11-01 | Stop reason: SDUPTHER

## 2024-10-31 NOTE — TELEPHONE ENCOUNTER
Rx Refill Note  Requested Prescriptions     Pending Prescriptions Disp Refills    lisinopril (PRINIVIL,ZESTRIL) 20 MG tablet 90 tablet 1     Sig: Take 1 tablet by mouth Daily.    ezetimibe (ZETIA) 10 MG tablet 90 tablet 1     Sig: Take 1 tablet by mouth Daily.      Last office visit with prescribing clinician: 2/28/2024   Last telemedicine visit with prescribing clinician: Visit date not found   Next office visit with prescribing clinician: 11/19/2024                         Would you like a call back once the refill request has been completed: [] Yes [] No    If the office needs to give you a call back, can they leave a voicemail: [] Yes [] No    Erica Roldan MA  10/31/24, 14:12 EDT

## 2024-11-01 RX ORDER — LISINOPRIL 20 MG/1
20 TABLET ORAL DAILY
Qty: 90 TABLET | Refills: 1 | Status: SHIPPED | OUTPATIENT
Start: 2024-11-01

## 2024-11-01 RX ORDER — EZETIMIBE 10 MG/1
10 TABLET ORAL DAILY
Qty: 90 TABLET | Refills: 1 | Status: SHIPPED | OUTPATIENT
Start: 2024-11-01

## 2024-11-22 ENCOUNTER — OFFICE VISIT (OUTPATIENT)
Dept: INTERNAL MEDICINE | Facility: CLINIC | Age: 69
End: 2024-11-22
Payer: MEDICARE

## 2024-11-22 VITALS
HEART RATE: 75 BPM | BODY MASS INDEX: 32.46 KG/M2 | HEIGHT: 68 IN | DIASTOLIC BLOOD PRESSURE: 82 MMHG | OXYGEN SATURATION: 97 % | WEIGHT: 214.2 LBS | TEMPERATURE: 97.3 F | SYSTOLIC BLOOD PRESSURE: 130 MMHG | RESPIRATION RATE: 18 BRPM

## 2024-11-22 DIAGNOSIS — Z11.59 NEED FOR HEPATITIS C SCREENING TEST: ICD-10-CM

## 2024-11-22 DIAGNOSIS — Z00.00 ENCOUNTER FOR ANNUAL WELLNESS EXAM IN MEDICARE PATIENT: ICD-10-CM

## 2024-11-22 DIAGNOSIS — E78.5 HYPERLIPIDEMIA, UNSPECIFIED HYPERLIPIDEMIA TYPE: Primary | Chronic | ICD-10-CM

## 2024-11-22 DIAGNOSIS — Z12.11 SCREEN FOR COLON CANCER: ICD-10-CM

## 2024-11-22 DIAGNOSIS — Z12.5 SCREENING PSA (PROSTATE SPECIFIC ANTIGEN): ICD-10-CM

## 2024-11-22 DIAGNOSIS — I10 PRIMARY HYPERTENSION: Chronic | ICD-10-CM

## 2024-11-22 LAB
ALBUMIN SERPL-MCNC: 3.8 G/DL (ref 3.5–5.2)
ALBUMIN/GLOB SERPL: 1.5 G/DL
ALP SERPL-CCNC: 97 U/L (ref 39–117)
ALT SERPL W P-5'-P-CCNC: 20 U/L (ref 1–41)
ANION GAP SERPL CALCULATED.3IONS-SCNC: 8.2 MMOL/L (ref 5–15)
AST SERPL-CCNC: 16 U/L (ref 1–40)
BASOPHILS # BLD AUTO: 0.03 10*3/MM3 (ref 0–0.2)
BASOPHILS NFR BLD AUTO: 0.3 % (ref 0–1.5)
BILIRUB SERPL-MCNC: 0.3 MG/DL (ref 0–1.2)
BUN SERPL-MCNC: 16 MG/DL (ref 8–23)
BUN/CREAT SERPL: 16.8 (ref 7–25)
CALCIUM SPEC-SCNC: 8.9 MG/DL (ref 8.6–10.5)
CHLORIDE SERPL-SCNC: 105 MMOL/L (ref 98–107)
CHOLEST SERPL-MCNC: 192 MG/DL (ref 0–200)
CO2 SERPL-SCNC: 25.8 MMOL/L (ref 22–29)
CREAT SERPL-MCNC: 0.95 MG/DL (ref 0.76–1.27)
DEPRECATED RDW RBC AUTO: 38.8 FL (ref 37–54)
EGFRCR SERPLBLD CKD-EPI 2021: 86.1 ML/MIN/1.73
EOSINOPHIL # BLD AUTO: 0.27 10*3/MM3 (ref 0–0.4)
EOSINOPHIL NFR BLD AUTO: 3.1 % (ref 0.3–6.2)
ERYTHROCYTE [DISTWIDTH] IN BLOOD BY AUTOMATED COUNT: 12.4 % (ref 12.3–15.4)
GLOBULIN UR ELPH-MCNC: 2.5 GM/DL
GLUCOSE SERPL-MCNC: 92 MG/DL (ref 65–99)
HCT VFR BLD AUTO: 46.4 % (ref 37.5–51)
HCV AB SER QL: NORMAL
HDLC SERPL-MCNC: 35 MG/DL (ref 40–60)
HGB BLD-MCNC: 15.8 G/DL (ref 13–17.7)
IMM GRANULOCYTES # BLD AUTO: 0.02 10*3/MM3 (ref 0–0.05)
IMM GRANULOCYTES NFR BLD AUTO: 0.2 % (ref 0–0.5)
LDLC SERPL CALC-MCNC: 131 MG/DL (ref 0–100)
LDLC/HDLC SERPL: 3.66 {RATIO}
LYMPHOCYTES # BLD AUTO: 1.86 10*3/MM3 (ref 0.7–3.1)
LYMPHOCYTES NFR BLD AUTO: 21.5 % (ref 19.6–45.3)
MCH RBC QN AUTO: 29.7 PG (ref 26.6–33)
MCHC RBC AUTO-ENTMCNC: 34.1 G/DL (ref 31.5–35.7)
MCV RBC AUTO: 87.2 FL (ref 79–97)
MONOCYTES # BLD AUTO: 0.66 10*3/MM3 (ref 0.1–0.9)
MONOCYTES NFR BLD AUTO: 7.6 % (ref 5–12)
NEUTROPHILS NFR BLD AUTO: 5.8 10*3/MM3 (ref 1.7–7)
NEUTROPHILS NFR BLD AUTO: 67.3 % (ref 42.7–76)
NRBC BLD AUTO-RTO: 0 /100 WBC (ref 0–0.2)
PLATELET # BLD AUTO: 196 10*3/MM3 (ref 140–450)
PMV BLD AUTO: 10.7 FL (ref 6–12)
POTASSIUM SERPL-SCNC: 4.2 MMOL/L (ref 3.5–5.2)
PROT SERPL-MCNC: 6.3 G/DL (ref 6–8.5)
PSA SERPL-MCNC: 2.29 NG/ML (ref 0–4)
RBC # BLD AUTO: 5.32 10*6/MM3 (ref 4.14–5.8)
SODIUM SERPL-SCNC: 139 MMOL/L (ref 136–145)
TRIGL SERPL-MCNC: 145 MG/DL (ref 0–150)
TSH SERPL DL<=0.05 MIU/L-ACNC: 3 UIU/ML (ref 0.27–4.2)
VLDLC SERPL-MCNC: 26 MG/DL (ref 5–40)
WBC NRBC COR # BLD AUTO: 8.64 10*3/MM3 (ref 3.4–10.8)

## 2024-11-22 PROCEDURE — 80061 LIPID PANEL: CPT | Performed by: NURSE PRACTITIONER

## 2024-11-22 PROCEDURE — 80053 COMPREHEN METABOLIC PANEL: CPT | Performed by: NURSE PRACTITIONER

## 2024-11-22 PROCEDURE — 85025 COMPLETE CBC W/AUTO DIFF WBC: CPT | Performed by: NURSE PRACTITIONER

## 2024-11-22 PROCEDURE — 86803 HEPATITIS C AB TEST: CPT | Performed by: NURSE PRACTITIONER

## 2024-11-22 PROCEDURE — G0103 PSA SCREENING: HCPCS | Performed by: NURSE PRACTITIONER

## 2024-11-22 PROCEDURE — 84443 ASSAY THYROID STIM HORMONE: CPT | Performed by: NURSE PRACTITIONER

## 2024-11-22 NOTE — LETTER
[facility]  Vaccine Consent Form    Patient Name:  Durga Butts  Patient :  1955     Vaccine(s) Ordered    Pneumococcal Conjugate Vaccine 20-Valent (PCV20)          Screening Checklist  The following questions should be completed prior to vaccination. If you answer “yes” to any question, it does not necessarily mean you should not be vaccinated. It just means we may need to clarify or ask more questions. If a question is unclear, please ask your healthcare provider to explain it.    Yes No   Any fever or moderate to severe illness today (mild illness and/or antibiotic treatment are not contraindications)?     Do you have a history of a serious reaction to any previous vaccinations, such as anaphylaxis, encephalopathy within 7 days, Guillain-Naches syndrome within 6 weeks, seizure?     Have you received any live vaccine(s) (e.g MMR, BRIDGER) or any other vaccines in the last month (to ensure duplicate doses aren't given)?     Do you have an anaphylactic allergy to latex (DTaP, DTaP-IPV, Hep A, Hep B, MenB, RV, Td, Tdap), baker’s yeast (Hep B, HPV), polysorbates (RSV, nirsevimab, PCV 20, Rotavirrus, Tdap, Shingrix), or gelatin (BRIDGER, MMR)?     Do you have an anaphylactic allergy to neomycin (Rabies, BRIDGER, MMR, IPV, Hep A), polymyxin B (IPV), or streptomycin (IPV)?      Any cancer, leukemia, AIDS, or other immune system disorder? (BRIDGER, MMR, RV)     Do you have a parent, brother, or sister with an immune system problem (if immune competence of vaccine recipient clinically verified, can proceed)? (MMR, BRIDGER)     Any recent steroid treatments for >2 weeks, chemotherapy, or radiation treatment? (BRIDGER, MMR)     Have you received antibody-containing blood transfusions or IVIG in the past 11 months (recommended interval is dependent on product)? (MMR, BRIDGER)     Have you taken antiviral drugs (acyclovir, famciclovir, valacyclovir for BRIDGER) in the last 24 or 48 hours, respectively?      Are you pregnant or planning to  "become pregnant within 1 month? (BRIDGER, MMR, HPV, IPV, MenB, Abrexvy; For Hep B- refer to Engerix-B; For RSV - Abrysvo is indicated for 32-36 weeks of pregnancy from September to January)     For infants, have you ever been told your child has had intussusception or a medical emergency involving obstruction of the intestine (Rotavirus)? If not for an infant, can skip this question.         *Ordering Physicians/APC should be consulted if \"yes\" is checked by the patient or guardian above.  I have received, read, and understand the Vaccine Information Statement (VIS) for each vaccine ordered.  I have considered my or my child's health status as well as the health status of my close contacts.  I have taken the opportunity to discuss my vaccine questions with my or my child's health care provider.   I have requested that the ordered vaccine(s) be given to me or my child.  I understand the benefits and risks of the vaccines.  I understand that I should remain in the clinic for 15 minutes after receiving the vaccine(s).  _________________________________________________________  Signature of Patient or Parent/Legal Guardian ____________________  Date   "

## 2024-11-22 NOTE — PROGRESS NOTES
" Subjective   The ABCs of the Annual Wellness Visit  Medicare Wellness Visit      Durga Butts is a 69 y.o. patient who presents for a Medicare Wellness Visit.    The following portions of the patient's history were reviewed and   updated as appropriate: allergies, current medications, past family history, past medical history, past social history, past surgical history, and problem list.    Compared to one year ago, the patient's physical   health is the same.  Compared to one year ago, the patient's mental   health is the same.    Recent Hospitalizations:  He was not admitted to the hospital during the last year.     Current Medical Providers:  Patient Care Team:  Ramandeep Gray APRN as PCP - General (Nurse Practitioner)    Outpatient Medications Prior to Visit   Medication Sig Dispense Refill    cyclobenzaprine (FLEXERIL) 10 MG tablet Take 1 tablet by mouth 3 (Three) Times a Day As Needed for Muscle Spasms. 30 tablet 0    diclofenac (VOLTAREN) 50 MG EC tablet Take 1 tablet by mouth 2 (Two) Times a Day. 180 tablet 0    ezetimibe (ZETIA) 10 MG tablet Take 1 tablet by mouth Daily. 90 tablet 1    lisinopril (PRINIVIL,ZESTRIL) 20 MG tablet Take 1 tablet by mouth Daily. 90 tablet 1    Magnesium 500 MG capsule Take 1 capsule by mouth Daily.      Vitamin D, Cholecalciferol, 50 MCG (2000 UT) capsule Take 1 capsule by mouth Daily. 90 capsule 3    Syringe 21G X 1\" 3 ML misc 1 syringe every 2 weeks for testosterone injection 10 each 1    Testosterone Cypionate 200 MG/ML solution Inject 0.5 mL as directed Every 14 (Fourteen) Days. 1.96 mL 1     No facility-administered medications prior to visit.     No opioid medication identified on active medication list. I have reviewed chart for other potential  high risk medication/s and harmful drug interactions in the elderly.      Aspirin is not on active medication list.  Aspirin use is not indicated based on review of current medical condition/s. Risk of harm outweighs " "potential benefits.  .    Patient Active Problem List   Diagnosis    Hyperlipidemia    Hypertension    Memory loss    Other fatigue    Trigger middle finger of left hand    Chest pain    Acute URI    Gastroenteritis    Laceration of left lower extremity    Tick bite of abdominal wall    Great toe pain, left    Hypogonadism in male    Chronic left shoulder pain     Advance Care Planning Advance Directive is not on file.  ACP discussion was held with the patient during this visit. Patient has an advance directive (not in EMR), copy requested.            Objective   Vitals:    24 0825   BP: 130/82   BP Location: Right arm   Patient Position: Sitting   Cuff Size: Adult   Pulse: 75   Resp: 18   Temp: 97.3 °F (36.3 °C)   TempSrc: Temporal   SpO2: 97%   Weight: 97.2 kg (214 lb 3.2 oz)   Height: 172.7 cm (68\")       Estimated body mass index is 32.57 kg/m² as calculated from the following:    Height as of this encounter: 172.7 cm (68\").    Weight as of this encounter: 97.2 kg (214 lb 3.2 oz).    BMI is >= 30 and <35. (Class 1 Obesity). The following options were offered after discussion;: exercise counseling/recommendations and nutrition counseling/recommendations       Does the patient have evidence of cognitive impairment? No                                                                                                Health  Risk Assessment    Smoking Status:  Social History     Tobacco Use   Smoking Status Former    Current packs/day: 0.00    Average packs/day: 0.5 packs/day for 30.0 years (15.0 ttl pk-yrs)    Types: Cigarettes    Start date: 1965    Quit date: 1995    Years since quittin.9    Passive exposure: Past   Smokeless Tobacco Never   Tobacco Comments    Started smoking when      Alcohol Consumption:  Social History     Substance and Sexual Activity   Alcohol Use Not Currently       Fall Risk Screen  STEADI Fall Risk Assessment was completed, and patient is at LOW risk for " falls.Assessment completed on:2024    Depression Screening   Little interest or pleasure in doing things? Not at all   Feeling down, depressed, or hopeless? Not at all   PHQ-2 Total Score 0      Health Habits and Functional and Cognitive Screenin/22/2024     8:31 AM   Functional & Cognitive Status   Do you have difficulty preparing food and eating? No   Do you have difficulty bathing yourself, getting dressed or grooming yourself? No   Do you have difficulty using the toilet? No   Do you have difficulty moving around from place to place? No   Do you have trouble with steps or getting out of a bed or a chair? No   Current Diet Well Balanced Diet   Dental Exam Not up to date   Eye Exam Up to date   Exercise (times per week) 7 times per week   Current Exercises Include Walking   Do you need help using the phone?  No   Are you deaf or do you have serious difficulty hearing?  No   Do you need help to go to places out of walking distance? No   Do you need help shopping? No   Do you need help preparing meals?  No   Do you need help with housework?  No   Do you need help with laundry? No   Do you need help taking your medications? No   Do you need help managing money? No   Do you ever drive or ride in a car without wearing a seat belt? No   Have you felt unusual stress, anger or loneliness in the last month? No   Who do you live with? Spouse   If you need help, do you have trouble finding someone available to you? No   Have you been bothered in the last four weeks by sexual problems? No   Do you have difficulty concentrating, remembering or making decisions? Yes           Age-appropriate Screening Schedule:  Refer to the list below for future screening recommendations based on patient's age, sex and/or medical conditions. Orders for these recommended tests are listed in the plan section. The patient has been provided with a written plan.    Health Maintenance List  Health Maintenance   Topic Date Due     COLORECTAL CANCER SCREENING  Never done    HEPATITIS C SCREENING  Never done    AAA SCREEN (ONE-TIME)  Never done    ZOSTER VACCINE (2 of 2) 02/20/2024    COVID-19 Vaccine (4 - 2024-25 season) 09/01/2024    LIPID PANEL  06/13/2025    ANNUAL WELLNESS VISIT  11/22/2025    BMI FOLLOWUP  11/22/2025    TDAP/TD VACCINES (2 - Td or Tdap) 03/14/2032    INFLUENZA VACCINE  Completed    Pneumococcal Vaccine 65+  Completed                                                                                                                                                CMS Preventative Services Quick Reference  Risk Factors Identified During Encounter  Immunizations Discussed/Encouraged: Influenza, Prevnar 20 (Pneumococcal 20-valent conjugate), Shingrix, and COVID19    The above risks/problems have been discussed with the patient.  Pertinent information has been shared with the patient in the After Visit Summary.  An After Visit Summary and PPPS were made available to the patient.    Follow Up:   Next Medicare Wellness visit to be scheduled in 1 year.         Additional E&M Note during same encounter follows:  Patient has additional, significant, and separately identifiable condition(s)/problem(s) that require work above and beyond the Medicare Wellness Visit     Chief Complaint  Medicare Wellness-subsequent    Subjective   HPI  Durga is also being seen today for additional medical problem/s.        History of Present Illness  The patient is a 69-year-old male who presents for a Medicare annual wellness visit as well as follow-up on hypertension and hyperlipidemia.    He reports no current issues with chest pain, headaches, or dizziness. However, he experienced a brief episode of chest pain while sitting at a traffic light yesterday evening, which he attributes to stress. He has not experienced any similar discomfort since then.    He mentions that his blood pressure tends to fluctuate after meals. He has been monitoring his blood  "pressure at home and has noticed some elevated readings since February 2024, but has managed to bring them down. He is unsure of the cause of these fluctuations but suspects it may be related to his diet or physical activity. He admits to rare alcohol consumption.    He has also experienced weight loss, dropping from 232 pounds to 212 pounds.    He is due for a colorectal exam and plans to receive his second shingles vaccine and COVID-19 vaccines. His last colonoscopy was normal.    FAMILY HISTORY  His brother had 12 to 14 inches of his intestinal tract removed because of polyps.    Father passed away in September after several months of palliative care           Objective   Vital Signs:  /82 (BP Location: Right arm, Patient Position: Sitting, Cuff Size: Adult)   Pulse 75   Temp 97.3 °F (36.3 °C) (Temporal)   Resp 18   Ht 172.7 cm (68\")   Wt 97.2 kg (214 lb 3.2 oz)   SpO2 97%   BMI 32.57 kg/m²   Physical Exam  Vitals and nursing note reviewed.   Constitutional:       General: He is not in acute distress.     Appearance: Normal appearance.   HENT:      Head: Normocephalic and atraumatic.      Right Ear: External ear normal.      Left Ear: External ear normal.      Nose: Nose normal.      Mouth/Throat:      Mouth: Mucous membranes are moist.   Eyes:      Conjunctiva/sclera: Conjunctivae normal.   Cardiovascular:      Rate and Rhythm: Normal rate and regular rhythm.      Pulses: Normal pulses.      Heart sounds: Normal heart sounds. No murmur heard.     No friction rub. No gallop.   Pulmonary:      Effort: Pulmonary effort is normal. No respiratory distress.      Breath sounds: No wheezing, rhonchi or rales.   Musculoskeletal:      Cervical back: Neck supple.      Right lower leg: No edema.      Left lower leg: No edema.   Skin:     General: Skin is warm and dry.   Neurological:      General: No focal deficit present.      Mental Status: He is alert and oriented to person, place, and time.   Psychiatric:   "       Mood and Affect: Mood normal.         Behavior: Behavior normal.                       Assessment and Plan            Encounter for annual wellness exam in Medicare patient    Orders:    Hepatitis C Antibody    Comprehensive Metabolic Panel    CBC & Differential    TSH    Lipid Panel    Hyperlipidemia, unspecified hyperlipidemia type       Orders:    Comprehensive Metabolic Panel    Lipid Panel    Primary hypertension      Orders:    Comprehensive Metabolic Panel    Lipid Panel    Need for hepatitis C screening test    Orders:    Hepatitis C Antibody    Screening PSA (prostate specific antigen)    Orders:    PSA Screen    Screen for colon cancer    Orders:    Ambulatory Referral For Screening Colonoscopy      Assessment & Plan  1. Hypertension.  His blood pressure readings have been satisfactory in recent weeks, with systolic values in the 120s and diastolic values around 80 to 82. He is advised to continue monitoring his blood pressure at home and maintain a healthy lifestyle, including regular physical activity and a balanced diet low in sodium. If his blood pressure readings increase despite these measures, medication adjustments will be considered.    2. Hyperlipidemia.  Lab work will be conducted today to assess his cholesterol levels. He is advised to continue his current medication regimen and maintain a healthy diet to manage his lipid levels.    3. Cardiovascular risk.  He experienced a small pain in his chest while sitting at a traffic light, which resolved quickly. Given his risk factors for cardiovascular disease, it is crucial to monitor his condition closely. If he experiences recurrent or severe chest pain, he will need to evaluation immediately in the ER.    4. Health Maintenance.  He is due for a colorectal exam. His previous colonoscopy was normal, but given his brother's history of polyps, a screening colonoscopy will be scheduled to ensure his continued health.  He will receive the Prevnar  20 and influenza vaccines today. He is advised to get the shingles and COVID-19 vaccines in two weeks.       Patient or patient representative verbalized consent for the use of Ambient Listening during the visit with  WARREN Leary for chart documentation. 11/22/2024  12:57 EST    Follow Up   Return in about 6 months (around 5/22/2025).  Patient was given instructions and counseling regarding his condition or for health maintenance advice. Please see specific information pulled into the AVS if appropriate.

## 2024-12-02 ENCOUNTER — OFFICE VISIT (OUTPATIENT)
Dept: ORTHOPEDIC SURGERY | Facility: CLINIC | Age: 69
End: 2024-12-02
Payer: MEDICARE

## 2024-12-02 VITALS
WEIGHT: 213 LBS | SYSTOLIC BLOOD PRESSURE: 131 MMHG | HEIGHT: 68 IN | BODY MASS INDEX: 32.28 KG/M2 | HEART RATE: 63 BPM | OXYGEN SATURATION: 96 % | DIASTOLIC BLOOD PRESSURE: 82 MMHG

## 2024-12-02 DIAGNOSIS — M65.332 TRIGGER MIDDLE FINGER OF LEFT HAND: Primary | ICD-10-CM

## 2024-12-02 PROCEDURE — 1159F MED LIST DOCD IN RCRD: CPT | Performed by: STUDENT IN AN ORGANIZED HEALTH CARE EDUCATION/TRAINING PROGRAM

## 2024-12-02 PROCEDURE — 3075F SYST BP GE 130 - 139MM HG: CPT | Performed by: STUDENT IN AN ORGANIZED HEALTH CARE EDUCATION/TRAINING PROGRAM

## 2024-12-02 PROCEDURE — 3079F DIAST BP 80-89 MM HG: CPT | Performed by: STUDENT IN AN ORGANIZED HEALTH CARE EDUCATION/TRAINING PROGRAM

## 2024-12-02 PROCEDURE — 1160F RVW MEDS BY RX/DR IN RCRD: CPT | Performed by: STUDENT IN AN ORGANIZED HEALTH CARE EDUCATION/TRAINING PROGRAM

## 2024-12-02 PROCEDURE — 99213 OFFICE O/P EST LOW 20 MIN: CPT | Performed by: STUDENT IN AN ORGANIZED HEALTH CARE EDUCATION/TRAINING PROGRAM

## 2024-12-02 PROCEDURE — 20550 NJX 1 TENDON SHEATH/LIGAMENT: CPT | Performed by: STUDENT IN AN ORGANIZED HEALTH CARE EDUCATION/TRAINING PROGRAM

## 2024-12-02 RX ORDER — LIDOCAINE HYDROCHLORIDE 10 MG/ML
1 INJECTION, SOLUTION INFILTRATION; PERINEURAL
Status: COMPLETED | OUTPATIENT
Start: 2024-12-02 | End: 2024-12-02

## 2024-12-02 RX ORDER — TRIAMCINOLONE ACETONIDE 40 MG/ML
40 INJECTION, SUSPENSION INTRA-ARTICULAR; INTRAMUSCULAR
Status: COMPLETED | OUTPATIENT
Start: 2024-12-02 | End: 2024-12-02

## 2024-12-02 RX ADMIN — TRIAMCINOLONE ACETONIDE 40 MG: 40 INJECTION, SUSPENSION INTRA-ARTICULAR; INTRAMUSCULAR at 14:30

## 2024-12-02 RX ADMIN — LIDOCAINE HYDROCHLORIDE 1 ML: 10 INJECTION, SOLUTION INFILTRATION; PERINEURAL at 14:30

## 2024-12-02 NOTE — PROGRESS NOTES
"Chief Complaint  Follow-up and Pain of the Left Hand    Subjective          Durga Butts presents to [facility] for   History of Present Illness    Durga returns today for follow-up of his left hand.  He has a left third trigger finger we are treating conservatively.  He has done well with a trigger finger injection in the past.  He is interested in a repeat trigger finger injection today.      No Known Allergies     Social History     Socioeconomic History    Marital status:    Tobacco Use    Smoking status: Former     Current packs/day: 0.00     Average packs/day: 0.5 packs/day for 30.0 years (15.0 ttl pk-yrs)     Types: Cigarettes     Start date: 1965     Quit date: 1995     Years since quittin.9     Passive exposure: Past    Smokeless tobacco: Never    Tobacco comments:     Started smoking when    Vaping Use    Vaping status: Never Used   Substance and Sexual Activity    Alcohol use: Not Currently    Drug use: Not Currently    Sexual activity: Defer        I reviewed the patient's chief complaint, history of present illness, review of systems, past medical history, surgical history, family history, social history, medications, and allergy list.     REVIEW OF SYSTEMS    Constitutional: Denies fevers, chills, weight loss  Cardiovascular: Denies chest pain, shortness of breath  Skin: Denies rashes, acute skin changes  Neurologic: Denies headache, loss of consciousness  MSK: Left hand pain      Objective   Vital Signs:   /82   Pulse 63   Ht 172.7 cm (68\")   Wt 96.6 kg (213 lb)   SpO2 96%   BMI 32.39 kg/m²     Body mass index is 32.39 kg/m².    Physical Exam    General: Alert. No acute distress.   Left upper extremity: Tender over the A1 pulley at the base of the third finger. Full extension and flexion. A palpable nodule is present. No locking noted. Neurovascular intact.     Small Joint Arthrocentesis  Consent given by: patient  Site marked: " site marked  Timeout: Immediately prior to procedure a time out was called to verify the correct patient, procedure, equipment, support staff and site/side marked as required   Supporting Documentation  Indications: pain   Procedure Details  Location: long finger (LEFT) -   Preparation: Patient was prepped and draped in the usual sterile fashion  Needle gauge: 23 G.  Medications administered: 1 mL lidocaine 1 %; 40 mg triamcinolone acetonide 40 MG/ML  Patient tolerance: patient tolerated the procedure well with no immediate complications      This injection documentation was Scribed for Dennis Castorena MD by Katelyn Vasquez.  12/02/24   15:24 EST    Imaging Results (Most Recent)       None                     Assessment and Plan        No results found.     Diagnoses and all orders for this visit:    1. Trigger middle finger of left hand (Primary)    Other orders  -     Cancel: Large Joint Arthrocentesis  -     Small Joint Arthrocentesis        We discussed nonoperative management.  We discussed the risks, benefits, indications, alternatives to a left third trigger finger injection.  He elected proceed and tolerated the injection well.  He may follow-up as needed      Call or return if worsening symptoms.    Scribed for Dennis Castorena MD by Katelyn Vasquze  12/02/2024   14:39 EST         Follow Up       As needed    Patient was given instructions and counseling regarding his condition or for health maintenance advice. Please see specific information pulled into the AVS if appropriate.       I have personally performed the services described in this document as scribed by the above individual and it is both accurate and complete. Dennis Castorena MD 12/02/24 16:50 EST

## 2025-04-18 DIAGNOSIS — M75.112 INCOMPLETE TEAR OF LEFT ROTATOR CUFF, UNSPECIFIED WHETHER TRAUMATIC: ICD-10-CM

## 2025-04-18 DIAGNOSIS — M75.22 BICEPS TENDONITIS ON LEFT: ICD-10-CM

## 2025-04-18 DIAGNOSIS — M25.512 LEFT SHOULDER PAIN, UNSPECIFIED CHRONICITY: ICD-10-CM

## 2025-04-18 DIAGNOSIS — M75.122 COMPLETE TEAR OF LEFT ROTATOR CUFF, UNSPECIFIED WHETHER TRAUMATIC: ICD-10-CM

## 2025-04-18 RX ORDER — MULTIVIT-MIN/IRON/FOLIC ACID/K 18-600-40
1 CAPSULE ORAL DAILY
Qty: 90 CAPSULE | Refills: 1 | Status: SHIPPED | OUTPATIENT
Start: 2025-04-18

## 2025-04-18 RX ORDER — CYCLOBENZAPRINE HCL 10 MG
10 TABLET ORAL 3 TIMES DAILY PRN
Qty: 30 TABLET | Refills: 0 | OUTPATIENT
Start: 2025-04-18

## 2025-05-05 RX ORDER — LISINOPRIL 20 MG/1
20 TABLET ORAL DAILY
Qty: 90 TABLET | Refills: 1 | Status: SHIPPED | OUTPATIENT
Start: 2025-05-05

## 2025-05-05 RX ORDER — EZETIMIBE 10 MG/1
10 TABLET ORAL DAILY
Qty: 90 TABLET | Refills: 1 | Status: SHIPPED | OUTPATIENT
Start: 2025-05-05

## 2025-05-05 NOTE — TELEPHONE ENCOUNTER
Caller: Durga Butts    Relationship: Self    Best call back number: [phone]     Requested Prescriptions:   Requested Prescriptions     Pending Prescriptions Disp Refills    ezetimibe (ZETIA) 10 MG tablet 90 tablet 1     Sig: Take 1 tablet by mouth Daily.    lisinopril (PRINIVIL,ZESTRIL) 20 MG tablet 90 tablet 1     Sig: Take 1 tablet by mouth Daily.        Pharmacy where request should be sent: [facility] [phone] [place], [place] [id number] FX     Last office visit with prescribing clinician: 11/22/2024   Last telemedicine visit with prescribing clinician: Visit date not found   Next office visit with prescribing clinician: 5/22/2025       Does the patient have less than a 3 day supply:  [x] Yes  [] No    Would you like a call back once the refill request has been completed: [x] Yes [] No    If the office needs to give you a call back, can they leave a voicemail: [x] Yes [] No    Lola Romero Rep   05/05/25 10:30 EDT

## 2025-06-11 ENCOUNTER — PREP FOR SURGERY (OUTPATIENT)
Dept: OTHER | Facility: HOSPITAL | Age: 70
End: 2025-06-11
Payer: MEDICARE

## 2025-06-11 ENCOUNTER — OFFICE VISIT (OUTPATIENT)
Dept: SURGERY | Facility: CLINIC | Age: 70
End: 2025-06-11
Payer: MEDICARE

## 2025-06-11 VITALS
HEART RATE: 54 BPM | OXYGEN SATURATION: 97 % | DIASTOLIC BLOOD PRESSURE: 76 MMHG | BODY MASS INDEX: 32.58 KG/M2 | WEIGHT: 214.95 LBS | SYSTOLIC BLOOD PRESSURE: 123 MMHG | HEIGHT: 68 IN

## 2025-06-11 DIAGNOSIS — Z12.11 SCREENING FOR MALIGNANT NEOPLASM OF COLON: Primary | ICD-10-CM

## 2025-06-11 DIAGNOSIS — Z12.11 ENCOUNTER FOR SCREENING FOR MALIGNANT NEOPLASM OF COLON: Primary | ICD-10-CM

## 2025-06-11 DIAGNOSIS — Z86.0100 HISTORY OF COLONIC POLYPS: ICD-10-CM

## 2025-06-11 RX ORDER — SODIUM CHLORIDE 0.9 % (FLUSH) 0.9 %
3 SYRINGE (ML) INJECTION EVERY 12 HOURS SCHEDULED
OUTPATIENT
Start: 2025-06-11

## 2025-06-11 RX ORDER — POLYETHYLENE GLYCOL 3350 17 G/17G
POWDER, FOR SOLUTION ORAL
Qty: 238 PACKET | Refills: 0 | Status: SHIPPED | OUTPATIENT
Start: 2025-06-11

## 2025-06-11 RX ORDER — SODIUM CHLORIDE 0.9 % (FLUSH) 0.9 %
10 SYRINGE (ML) INJECTION AS NEEDED
OUTPATIENT
Start: 2025-06-11

## 2025-06-11 RX ORDER — BISACODYL 5 MG
TABLET, DELAYED RELEASE (ENTERIC COATED) ORAL
Qty: 4 TABLET | Refills: 0 | Status: SHIPPED | OUTPATIENT
Start: 2025-06-11

## 2025-06-11 RX ORDER — SODIUM CHLORIDE 9 MG/ML
40 INJECTION, SOLUTION INTRAVENOUS AS NEEDED
OUTPATIENT
Start: 2025-06-11

## 2025-06-11 NOTE — PROGRESS NOTES
Chief Complaint: Colonoscopy    Subjective      Colonoscopy consultation       History of Present Illness  Durga Butts is a 70 y.o. male presents to [facility] for colonoscopy consultation.    Patient presents today without complaints for screening colonoscopy.  He denies any abdominal pain, change in bowel habit, or rectal bleeding.  Denies any family history of colorectal cancer.  Admits to history of colonic polyps.    Denies LETA.  Denies any cardiac issues.  Denies take any GLP-1 receptors.    4/18: Colonoscopy (Ana): Sigmoid- negative for adenoma.     4/07: Colonoscopy (Ana): Sigmoid- hyperplastic; diverticulosis; hemorrhoids.       Objective     Past Medical History:   Diagnosis Date    Allergic a few years ago    Cataract Early 2021    [facility] removed cataracts, replaced lenses in both eyes    Fracture of ankle apx 7 years ago    Fracture, tibia and fibula several years ago    Hyperlipidemia     Hypertension     Periarthritis of shoulder a year ago       Past Surgical History:   Procedure Laterality Date    COLONOSCOPY  a few years ago    EYE SURGERY  June 2022    Cataract surgery       Outpatient Medications Marked as Taking for the 6/11/25 encounter (Office Visit) with Barron April, APRN   Medication Sig Dispense Refill    ezetimibe (ZETIA) 10 MG tablet Take 1 tablet by mouth Daily. 90 tablet 1    lisinopril (PRINIVIL,ZESTRIL) 20 MG tablet Take 1 tablet by mouth Daily. 90 tablet 1    Magnesium 500 MG capsule Take 1 capsule by mouth Daily.      Vitamin D, Cholecalciferol, 50 MCG (2000 UT) capsule Take 1 capsule by mouth Daily. 90 capsule 1       No Known Allergies     Family History   Problem Relation Age of Onset    Heart disease Father     Heart disease Brother     Hypertension Brother        Social History     Socioeconomic History    Marital status:    Tobacco Use    Smoking status: Former     Current packs/day: 0.00     Average packs/day:  "0.5 packs/day for 30.0 years (15.0 ttl pk-yrs)     Types: Cigarettes     Start date: 1965     Quit date: 1995     Years since quittin.4     Passive exposure: Past    Smokeless tobacco: Never    Tobacco comments:     Started smoking when    Vaping Use    Vaping status: Never Used   Substance and Sexual Activity    Alcohol use: Not Currently    Drug use: Not Currently    Sexual activity: Not Currently     Partners: Female     Birth control/protection: None       Review of Systems   Constitutional:  Negative for chills and fever.   Gastrointestinal:  Negative for abdominal distention, abdominal pain, anal bleeding, blood in stool, constipation, diarrhea and rectal pain.        Vital Signs:   /76 (BP Location: Left arm, Patient Position: Sitting, Cuff Size: Large Adult)   Pulse 54   Ht 172.7 cm (68\")   Wt 97.5 kg (214 lb 15.2 oz)   SpO2 97%   BMI 32.68 kg/m²      Physical Exam  Vitals and nursing note reviewed.   Constitutional:       General: He is not in acute distress.     Appearance: He is obese. He is not ill-appearing.   HENT:      Head: Normocephalic and atraumatic.   Cardiovascular:      Rate and Rhythm: Normal rate.   Pulmonary:      Effort: Pulmonary effort is normal.      Breath sounds: No stridor.   Abdominal:      Palpations: Abdomen is soft.      Tenderness: There is no guarding.   Musculoskeletal:         General: No deformity. Normal range of motion.   Skin:     General: Skin is warm and dry.      Coloration: Skin is not jaundiced.   Neurological:      General: No focal deficit present.      Mental Status: He is alert and oriented to person, place, and time.   Psychiatric:         Mood and Affect: Mood normal.          Result Review :          []  Laboratory  []  Radiology  []  Pathology  []  Microbiology  []  EKG/Telemetry   []  Cardiology/Vascular   []  Old records  I spent 15 minutes caring for Durga on this date of service. This time includes time spent by me in the " following activities: reviewing tests, obtaining and/or reviewing a separately obtained history, performing a medically appropriate examination and/or evaluation, ordering medications, tests, or procedures, and documenting information in the medical record        Assessment and Plan    Diagnoses and all orders for this visit:    1. Encounter for screening for malignant neoplasm of colon (Primary)    2. History of colonic polyps    Other orders  -     polyethylene glycol (MIRALAX) 17 g packet; Take as directed.  Instructions given in office.  Dispense: 238 g bottle  Dispense: 238 packet; Refill: 0  -     bisacodyl (Dulcolax) 5 MG EC tablet; Take as directed.  Dispense: 4 tablet; Refill: 0        Follow Up   Return for Schedule colonoscopy with Dr. Perez on 8/18/2025 at [facility].    Hospital arrival time: 0700.    Possible risks/complications, benefits, and alternatives to surgical or invasive procedures have been explained to patient and/or legal guardian.    Patient has been evaluated and can tolerate anesthesia and/or sedation. Risks, benefits, and alternatives to anesthesia and sedation have been explained to the patient and/or legal guardian. Patient verbalizes understanding and is willing to proceed with the above plan.     Patient was given instructions and counseling regarding his condition or for health maintenance advice. Please see specific information pulled into the AVS if appropriate.     As always, it has been a pleasure to participate in your patient's care. Please call with questions or concerns.

## 2025-07-10 ENCOUNTER — OFFICE VISIT (OUTPATIENT)
Dept: INTERNAL MEDICINE | Facility: CLINIC | Age: 70
End: 2025-07-10
Payer: MEDICARE

## 2025-07-10 VITALS
SYSTOLIC BLOOD PRESSURE: 115 MMHG | HEART RATE: 75 BPM | WEIGHT: 220.2 LBS | HEIGHT: 68 IN | DIASTOLIC BLOOD PRESSURE: 71 MMHG | BODY MASS INDEX: 33.37 KG/M2 | RESPIRATION RATE: 18 BRPM | TEMPERATURE: 98 F | OXYGEN SATURATION: 96 %

## 2025-07-10 DIAGNOSIS — I10 PRIMARY HYPERTENSION: ICD-10-CM

## 2025-07-10 DIAGNOSIS — M72.2 PLANTAR FASCIITIS: ICD-10-CM

## 2025-07-10 DIAGNOSIS — E78.5 HYPERLIPIDEMIA, UNSPECIFIED HYPERLIPIDEMIA TYPE: Primary | ICD-10-CM

## 2025-07-10 DIAGNOSIS — L30.9 ECZEMA, UNSPECIFIED TYPE: ICD-10-CM

## 2025-07-10 LAB
ALBUMIN SERPL-MCNC: 4 G/DL (ref 3.5–5.2)
ALBUMIN/GLOB SERPL: 1.5 G/DL
ALP SERPL-CCNC: 89 U/L (ref 39–117)
ALT SERPL W P-5'-P-CCNC: 17 U/L (ref 1–41)
ANION GAP SERPL CALCULATED.3IONS-SCNC: 10.2 MMOL/L (ref 5–15)
AST SERPL-CCNC: 17 U/L (ref 1–40)
BASOPHILS # BLD AUTO: 0.03 10*3/MM3 (ref 0–0.2)
BASOPHILS NFR BLD AUTO: 0.3 % (ref 0–1.5)
BILIRUB SERPL-MCNC: 0.5 MG/DL (ref 0–1.2)
BUN SERPL-MCNC: 17 MG/DL (ref 8–23)
BUN/CREAT SERPL: 16.2 (ref 7–25)
CALCIUM SPEC-SCNC: 9.3 MG/DL (ref 8.6–10.5)
CHLORIDE SERPL-SCNC: 105 MMOL/L (ref 98–107)
CHOLEST SERPL-MCNC: 196 MG/DL (ref 0–200)
CO2 SERPL-SCNC: 22.8 MMOL/L (ref 22–29)
CREAT SERPL-MCNC: 1.05 MG/DL (ref 0.76–1.27)
DEPRECATED RDW RBC AUTO: 38.5 FL (ref 37–54)
EGFRCR SERPLBLD CKD-EPI 2021: 75.9 ML/MIN/1.73
EOSINOPHIL # BLD AUTO: 0.34 10*3/MM3 (ref 0–0.4)
EOSINOPHIL NFR BLD AUTO: 3.5 % (ref 0.3–6.2)
ERYTHROCYTE [DISTWIDTH] IN BLOOD BY AUTOMATED COUNT: 12.8 % (ref 12.3–15.4)
GLOBULIN UR ELPH-MCNC: 2.7 GM/DL
GLUCOSE SERPL-MCNC: 92 MG/DL (ref 65–99)
HCT VFR BLD AUTO: 45.7 % (ref 37.5–51)
HDLC SERPL-MCNC: 36 MG/DL (ref 40–60)
HGB BLD-MCNC: 15.7 G/DL (ref 13–17.7)
IMM GRANULOCYTES # BLD AUTO: 0.04 10*3/MM3 (ref 0–0.05)
IMM GRANULOCYTES NFR BLD AUTO: 0.4 % (ref 0–0.5)
LDLC SERPL CALC-MCNC: 141 MG/DL (ref 0–100)
LDLC/HDLC SERPL: 3.88 {RATIO}
LYMPHOCYTES # BLD AUTO: 1.92 10*3/MM3 (ref 0.7–3.1)
LYMPHOCYTES NFR BLD AUTO: 19.7 % (ref 19.6–45.3)
MCH RBC QN AUTO: 29.4 PG (ref 26.6–33)
MCHC RBC AUTO-ENTMCNC: 34.4 G/DL (ref 31.5–35.7)
MCV RBC AUTO: 85.6 FL (ref 79–97)
MONOCYTES # BLD AUTO: 0.7 10*3/MM3 (ref 0.1–0.9)
MONOCYTES NFR BLD AUTO: 7.2 % (ref 5–12)
NEUTROPHILS NFR BLD AUTO: 6.72 10*3/MM3 (ref 1.7–7)
NEUTROPHILS NFR BLD AUTO: 68.9 % (ref 42.7–76)
NRBC BLD AUTO-RTO: 0 /100 WBC (ref 0–0.2)
PLATELET # BLD AUTO: 224 10*3/MM3 (ref 140–450)
PMV BLD AUTO: 10.4 FL (ref 6–12)
POTASSIUM SERPL-SCNC: 4.4 MMOL/L (ref 3.5–5.2)
PROT SERPL-MCNC: 6.7 G/DL (ref 6–8.5)
RBC # BLD AUTO: 5.34 10*6/MM3 (ref 4.14–5.8)
SODIUM SERPL-SCNC: 138 MMOL/L (ref 136–145)
TRIGL SERPL-MCNC: 102 MG/DL (ref 0–150)
VLDLC SERPL-MCNC: 19 MG/DL (ref 5–40)
WBC NRBC COR # BLD AUTO: 9.75 10*3/MM3 (ref 3.4–10.8)

## 2025-07-10 PROCEDURE — 80061 LIPID PANEL: CPT | Performed by: NURSE PRACTITIONER

## 2025-07-10 PROCEDURE — 85025 COMPLETE CBC W/AUTO DIFF WBC: CPT | Performed by: NURSE PRACTITIONER

## 2025-07-10 PROCEDURE — 80053 COMPREHEN METABOLIC PANEL: CPT | Performed by: NURSE PRACTITIONER

## 2025-07-10 NOTE — ASSESSMENT & PLAN NOTE
{Hypertension is (optional):[id number]}    Orders:    Comprehensive Metabolic Panel    CBC & Differential    Lipid Panel

## 2025-07-10 NOTE — PROGRESS NOTES
Chief Complaint  Hyperlipidemia, Hypertension (6 months follow up), and Pain (Right foot and heel-dry skin in the right ear)      Subjective      History of Present Illness  The patient is a 70-year-old male presenting for a 6-month follow-up regarding hypertension and hyperlipidemia, with additional complaints of right foot and heel pain, and concerns related to the ear.    The patient's blood pressure management has shown improvement but remains variable. He utilizes an [employer] device for home monitoring. A recent dental infection may have contributed to a transient elevation in blood pressure. The patient's diet remains unchanged, and he expresses dissatisfaction with his weight and a reduction in physical activity. He has resumed work and engages in yard work. He reports no snoring and generally feels rested upon waking, although he occasionally experiences sleep disturbances due to stress. He requires 9 hours in bed to achieve approximately 7.5 hours of sleep. He uses a smartwatch to monitor his sleep patterns, noting frequent awakenings and reduced nocturnal urination, yet persistent restlessness. He also reports a recurrence of upper arm pain.    The patient describes right heel pain that radiates to the anterior aspect of the foot, potentially attributable to calcaneal spurs or muscular issues. The pain is most pronounced upon initial ambulation after waking and when wearing certain footwear. He experiences pain during stair navigation, particularly when using the forefoot. He wears slippers to bed for comfort.    The patient reports xerosis in the right ear and applies body lotion and cream for severely dry heels, which provides temporary relief. He applies the cream every morning. There are no reported issues with the left ear. Over-the-counter hydrocortisone cream 1% effectively alleviates pruritus and provides daily relief.         Objective   Vital Signs:   Vitals:    07/10/25 0750   BP: 115/71   BP  "Location: Left arm   Patient Position: Sitting   Cuff Size: Adult   Pulse: 75   Resp: 18   Temp: 98 °F (36.7 °C)   TempSrc: Temporal   SpO2: 96%   Weight: 99.9 kg (220 lb 3.2 oz)   Height: 172.7 cm (67.99\")     Body mass index is 33.49 kg/m².    Wt Readings from Last 3 Encounters:   07/10/25 99.9 kg (220 lb 3.2 oz)   06/11/25 97.5 kg (214 lb 15.2 oz)   12/02/24 96.6 kg (213 lb)     BP Readings from Last 3 Encounters:   07/10/25 115/71   06/11/25 123/76   12/02/24 131/82       Health Maintenance   Topic Date Due    COLORECTAL CANCER SCREENING  Never done    AAA SCREEN ONCE  Never done    ZOSTER VACCINE (2 of 2) 02/20/2024    COVID-19 Vaccine (4 - 2024-25 season) 09/01/2024    INFLUENZA VACCINE  10/01/2025    ANNUAL WELLNESS VISIT  11/22/2025    LIPID PANEL  11/22/2025    TDAP/TD VACCINES (2 - Td or Tdap) 03/14/2032    HEPATITIS C SCREENING  Completed    Pneumococcal Vaccine 50+  Completed       Physical Exam  Vitals and nursing note reviewed.   Constitutional:       General: He is not in acute distress.     Appearance: Normal appearance.   HENT:      Head: Normocephalic and atraumatic.      Right Ear: External ear normal.      Left Ear: External ear normal.      Nose: Nose normal.      Mouth/Throat:      Mouth: Mucous membranes are moist.   Eyes:      Conjunctiva/sclera: Conjunctivae normal.   Cardiovascular:      Rate and Rhythm: Normal rate and regular rhythm.      Pulses: Normal pulses.      Heart sounds: Normal heart sounds. No murmur heard.     No friction rub. No gallop.   Pulmonary:      Effort: Pulmonary effort is normal. No respiratory distress.      Breath sounds: No wheezing, rhonchi or rales.   Musculoskeletal:      Cervical back: Neck supple.      Right lower leg: No edema.      Left lower leg: No edema.        Feet:    Feet:      Comments: Area of tenderness without erythema or lesion  Skin:     General: Skin is warm and dry.   Neurological:      General: No focal deficit present.      Mental Status: He " is alert and oriented to person, place, and time.   Psychiatric:         Mood and Affect: Mood normal.         Behavior: Behavior normal.        Physical Exam        Result Review :  The following data was reviewed by: WARREN Leary on 07/10/2025:         Results              Procedures            Assessment & Plan  Hyperlipidemia, unspecified hyperlipidemia type       Orders:    Comprehensive Metabolic Panel    CBC & Differential    Lipid Panel    Primary hypertension      Orders:    Comprehensive Metabolic Panel    CBC & Differential    Lipid Panel    Plantar fasciitis         Eczema, unspecified type              Assessment & Plan  1. Hypertension:  - Systolic BP normal, diastolic <80 for optimal control.  - BP readings improved over past year.  - Continue home BP monitoring, rest 15 minutes prior, maintain log.  - Inform if diastolic 85-90 consistently over 2 weeks for potential medication adjustment.    2. Right heel pain:  - Possible plantar fasciitis.  - Perform exercises and stretches for fascial layer.  - Avoid walking barefoot or footwear without arch support/cushioning.  - Provide exercises; consider podiatry referral if no improvement in 4-6 weeks.    3.  Eczema in right ear:  - Condition controlled with current treatment.  - Continue using good moisturizer.  - Apply OTC hydrocortisone cream 1% for flare-ups.  - Adjust treatment for seasonal changes.    4. Hyperlipidemia:  - Cholesterol checked 11/2024.  - Labs drawn today for further evaluation.    Patient or patient representative verbalized consent for the use of Ambient Listening during the visit with  WARREN Leary for chart documentation. 7/10/2025  12:53 EDT      FOLLOW UP  Return in about 4 months (around 11/23/2025).  Patient was given instructions and counseling regarding his condition or for health maintenance advice. Please see specific information pulled into the AVS if appropriate.     WARREN Leary  07/10/25  12:53  EDT    CURRENT & DISCONTINUED MEDICATIONS  Current Outpatient Medications   Medication Instructions    ezetimibe (ZETIA) 10 mg, Oral, Daily    lisinopril (PRINIVIL,ZESTRIL) 20 mg, Oral, Daily    Vitamin D (Cholecalciferol) 50 mcg, Oral, Daily       Medications Discontinued During This Encounter   Medication Reason    bisacodyl (Dulcolax) 5 MG EC tablet     cyclobenzaprine (FLEXERIL) 10 MG tablet     diclofenac (VOLTAREN) 50 MG EC tablet     Magnesium 500 MG capsule     polyethylene glycol (MIRALAX) 17 g packet

## 2025-07-10 NOTE — ASSESSMENT & PLAN NOTE
{Hyperlipidemia A/P Block (Optional):[id number]}    Orders:    Comprehensive Metabolic Panel    CBC & Differential    Lipid Panel

## 2025-08-04 ENCOUNTER — TELEPHONE (OUTPATIENT)
Dept: INTERNAL MEDICINE | Facility: CLINIC | Age: 70
End: 2025-08-04
Payer: MEDICARE

## 2025-08-04 DIAGNOSIS — M79.671 PAIN OF RIGHT HEEL: Primary | ICD-10-CM

## 2025-08-13 ENCOUNTER — TELEPHONE (OUTPATIENT)
Dept: SURGERY | Facility: CLINIC | Age: 70
End: 2025-08-13
Payer: MEDICARE

## 2025-08-19 ENCOUNTER — TELEPHONE (OUTPATIENT)
Dept: SURGERY | Facility: CLINIC | Age: 70
End: 2025-08-19
Payer: MEDICARE

## 2025-08-29 ENCOUNTER — OFFICE VISIT (OUTPATIENT)
Dept: ORTHOPEDIC SURGERY | Facility: CLINIC | Age: 70
End: 2025-08-29
Payer: MEDICARE

## 2025-08-29 VITALS
BODY MASS INDEX: 33.65 KG/M2 | HEART RATE: 62 BPM | SYSTOLIC BLOOD PRESSURE: 134 MMHG | DIASTOLIC BLOOD PRESSURE: 81 MMHG | WEIGHT: 222 LBS | OXYGEN SATURATION: 96 % | HEIGHT: 68 IN

## 2025-08-29 DIAGNOSIS — M65.332 TRIGGER MIDDLE FINGER OF LEFT HAND: Primary | ICD-10-CM

## 2025-08-29 RX ORDER — LIDOCAINE HYDROCHLORIDE 10 MG/ML
1 INJECTION, SOLUTION INFILTRATION; PERINEURAL
Status: COMPLETED | OUTPATIENT
Start: 2025-08-29 | End: 2025-08-29

## 2025-08-29 RX ORDER — TRIAMCINOLONE ACETONIDE 40 MG/ML
40 INJECTION, SUSPENSION INTRA-ARTICULAR; INTRAMUSCULAR
Status: COMPLETED | OUTPATIENT
Start: 2025-08-29 | End: 2025-08-29

## 2025-08-29 RX ADMIN — LIDOCAINE HYDROCHLORIDE 1 ML: 10 INJECTION, SOLUTION INFILTRATION; PERINEURAL at 13:30

## 2025-08-29 RX ADMIN — TRIAMCINOLONE ACETONIDE 40 MG: 40 INJECTION, SUSPENSION INTRA-ARTICULAR; INTRAMUSCULAR at 13:30
